# Patient Record
Sex: MALE | Race: WHITE | NOT HISPANIC OR LATINO | Employment: FULL TIME | URBAN - METROPOLITAN AREA
[De-identification: names, ages, dates, MRNs, and addresses within clinical notes are randomized per-mention and may not be internally consistent; named-entity substitution may affect disease eponyms.]

---

## 2017-01-12 ENCOUNTER — ALLSCRIPTS OFFICE VISIT (OUTPATIENT)
Dept: OTHER | Facility: OTHER | Age: 42
End: 2017-01-12

## 2017-01-12 DIAGNOSIS — R59.1 GENERALIZED ENLARGED LYMPH NODES: ICD-10-CM

## 2018-01-09 NOTE — RESULT NOTES
Verified Results  (1) CELIAC DISEASE AB PROFILE [id number] 07:20AM Jeri Amos   Performed at:  [address]  [id number]  : Gerald Pacheco MD, Phone:  [phone]     Test Name Result Flag Reference   tTG IGG 3 U/mL  0 - 5   Negative        0 - 5                              Weak Positive   6 - 9                              Positive           >9   tTG IGA <2 U/mL  0 - 3   Negative        0 -  3                              Weak Positive   4 - 10                              Positive           >10 Tissue Transglutaminase (tTG) has been identified as the endomysial antigen  Studies have demonstr- ated that endomysial IgA antibodies have over 99% specificity for gluten sensitive enteropathy     GLIADA 9 units  0 - 19   Negative                   0 - 19                   Weak Positive             20 - 30                   Moderate to Strong Positive   >30   GLIADG 4 units  0 - 19   Negative                   0 - 19                   Weak Positive             20 - 30                   Moderate to Strong Positive   >30   ENDOMYSIAL AB IGA Negative  Negative    mg/dL  91 - 414

## 2018-01-10 NOTE — RESULT NOTES
Message   Biopsy negative for microscopic colitis  Verified Results  (1) TISSUE EXAM [id number] 09:51AM Laurence Liner     Test Name Result Flag Reference   LAB AP CASE REPORT (Report)     Surgical Pathology Report             Case: [id number]                   Authorizing Provider: Nathan Harrell MD      Collected:      07/06/2016 [facility]        Ordering Location:   [address] [place] [address]   Received:      07/07/2016 [address] [facility]                               Pathologist:      Evy Zavala MD                              Specimen:  Colon, Random colon bx-cold   LAB AP FINAL DIAGNOSIS (Report)     A  Random colon biopsy:  - Benign colonic mucosa with nonspecific reactive changes   - No thickened basement membranes or intraepithelial lymphocytosis to   suggest microscopic colitis (i e  collagenous colitis or lymphocytic   colitis, resp  )   - No active or chronic colitis  - No glandular dysplasia and no evidence of malignancy  Interpretation performed at [address] [place] [address]  Electronically signed by Evy Zavala MD on 7/8/2016 at 4:03 PM   LAB AP SURGICAL ADDITIONAL INFORMATION (Report)     These tests were developed and their performance characteristics   determined by [facility] or [facility]  They may not be cleared or approved by the U [place]  Food and   Drug Administration  The FDA has determined that such clearance or   approval is not necessary  These tests are used for clinical purposes  They should not be regarded as investigational or for research  This   laboratory has been approved by CLIA 88, designated as a high-complexity   laboratory and is qualified to perform these tests  LAB AP GROSS DESCRIPTION (Report)     A  The specimen is received in formalin, labeled with the patient's name   and hospital number, and is designated random colon biopsy   The   specimen consists of multiple tan soft tissue fragments measuring in   aggregate 0 6 x 0 6 x 0 1 cm  Entirely submitted  One cassette  Note: The estimated total formalin fixation time based upon information   provided by the submitting clinician and the standard processing schedule   is 28 25 hours   [employer] [facility] [employer]    LAB AP CLINICAL INFORMATION      R/o microscopic colitis   R/o microscopic colitis

## 2018-01-11 NOTE — RESULT NOTES
Verified Results  (1) LIPID PANEL, FASTING [id number] 09:56AM Sukhjinder Dockery    Order Number: [id number]  TW Order Number: [id number] Order Number: [id number]     Test Name Result Flag Reference   CHOLESTEROL 152 mg/dL     HDL,DIRECT 31 mg/dL L 40-60   Specimen collection should occur prior to Metamizole administration due to the potential for falsely depressed results  LDL CHOLESTEROL CALCULATED 86 mg/dL  0-100   Triglyceride:         Normal              <150 mg/dl       Borderline High    150-199 mg/dl       High               200-499 mg/dl       Very High          >499 mg/dl  Cholesterol:         Desirable        <200 mg/dl      Borderline High  200-239 mg/dl      High             >239 mg/dl  HDL Cholesterol:        High    >59 mg/dL      Low     <41 mg/dL  LDL CALCULATED:    This screening LDL is a calculated result  It does not have the accuracy of the Direct Measured LDL in the monitoring of patients with hyperlipidemia and/or statin therapy  Direct Measure LDL (EYQ611) must be ordered separately in these patients  TRIGLYCERIDES 174 mg/dL H <=150   Specimen collection should occur prior to N-Acetylcysteine or Metamizole administration due to the potential for falsely depressed results  (1) COMPREHENSIVE METABOLIC PANEL [id number] 08:37TG Sukhjinder Dockery    Order Number: [id number]  TW Order Number: [id number] Order Number: [id number]     Test Name Result Flag Reference   GLUCOSE,RANDM 92 mg/dL     If the patient is fasting, the ADA then defines impaired fasting glucose as > 100 mg/dL and diabetes as > or equal to 123 mg/dL     SODIUM 142 mmol/L  136-145   POTASSIUM 4 5 mmol/L  3 5-5 3   CHLORIDE 104 mmol/L  100-108   CARBON DIOXIDE 28 mmol/L  21-32   ANION GAP (CALC) 10 mmol/L  4-13   BLOOD UREA NITROGEN 13 mg/dL  5-25   CREATININE 0 90 mg/dL  0 60-1 30   Standardized to IDMS reference method   CALCIUM 9 0 mg/dL  8 3-10 1   BILI, TOTAL 0 60 mg/dL  0 20-1 00   ALK PHOSPHATAS 51 U/L     ALT (SGPT) 49 U/L  12-78   AST(SGOT) 20 U/L  5-45   ALBUMIN 4 1 g/dL  3 5-5 0   TOTAL PROTEIN 7 3 g/dL  6 4-8 2   eGFR Non-African American      >60 0 ml/min/1 73sq [facility] [employer] Disease Education Program recommendations are as follows:  GFR calculation is accurate only with a steady state creatinine  Chronic Kidney disease less than 60 ml/min/1 73 sq  meters  Kidney failure less than 15 ml/min/1 73 sq  meters

## 2018-01-12 NOTE — RESULT NOTES
Verified Results  (1) OCCULT BLOOD, FECAL IMMUNOCHEMICAL TEST [id number] 07:39AM Ryann Alvarez   Performed by Fecal Immunochemical Test      Test Name Result Flag Reference   OCCULT BLD, FECAL IMMUNOLOGICAL Negative  Negative   POSITIVE CONTROL Positive     NEGATIVE CONTROL Negative

## 2018-01-13 VITALS
BODY MASS INDEX: 33.21 KG/M2 | DIASTOLIC BLOOD PRESSURE: 80 MMHG | SYSTOLIC BLOOD PRESSURE: 132 MMHG | WEIGHT: 232 LBS | HEIGHT: 70 IN

## 2018-01-13 NOTE — RESULT NOTES
Verified Results  (1) URINALYSIS w URINE C/S REFLEX (will reflex a microscopy if leukocytes, occult blood, or nitrites are not within normal limits) [id number] 05:56PM Javi Henriquez   TW Order Number: [id number]  TW Order Number: [id number]     Test Name Result Flag Reference   COLOR Yellow     CLARITY Clear     PH UA 6 0  4 5-8 0   LEUKOCYTE ESTERASE UA Negative  Negative   NITRITE UA Negative  Negative   PROTEIN UA Negative mg/dl  Negative   GLUCOSE UA Negative mg/dl  Negative   KETONES UA Negative mg/dl  Negative   UROBILINOGEN UA 0 2 E U /dl  0 2, 1 0 E U /dl   BILIRUBIN UA Negative  Negative   BLOOD UA Negative  Negative, Trace-Intact   SPECIFIC GRAVITY UA 1 010  1 003-1 030     (1) PSA (SCREEN) (Dx V76 44 Screen for Prostate Cancer) [id number] 05:56PM Joselyn Martha Order Number: [id number]  TW Order Number: [id number]     Test Name Result Flag Reference   PROSTATE SPECIFIC ANTIGEN 0 7 ng/mL  0 0-4 0   - Patient Instructions: This test is non-fasting  Please drink two glasses of water morning of bloodwork  - Patient Instructions: This test is non-fasting  Please drink two glasses of water morning of bloodwork  - Patient Instructions: This test is non-fasting  Please drink two glasses of water morning of bloodwork  - Patient Instructions: This test is non-fasting  Please drink two glasses of water morning of bloodwork

## 2018-01-17 NOTE — RESULT NOTES
Verified Results  (1) CBC/PLT/DIFF [id number] 07:20AM Xi Dickson     Test Name Result Flag Reference   WBC COUNT 5 79 Thousand/uL  4 31-10 16   RBC COUNT 5 50 Million/uL  3 88-5 62   HEMOGLOBIN 15 4 g/dL  12 0-17 0   HEMATOCRIT 45 6 %  36 5-49 3   MCV 83 fL  82-98   MCH 28 0 pg  26 8-34 3   MCHC 33 8 g/dL  31 4-37 4   RDW 14 0 %  11 6-15 1   MPV 10 8 fL  8 9-12 7   PLATELET COUNT 037 Thousands/uL  149-390   NEUTROPHILS RELATIVE PERCENT 55 %  43-75   LYMPHOCYTES RELATIVE PERCENT 28 %  14-44   MONOCYTES RELATIVE PERCENT 10 %  4-12   EOSINOPHILS RELATIVE PERCENT 6 %  0-6   BASOPHILS RELATIVE PERCENT 1 %  0-1   NEUTROPHILS ABSOLUTE COUNT 3 24 Thousands/µL  1 85-7 62   LYMPHOCYTES ABSOLUTE COUNT 1 63 Thousands/µL  0 60-4 47   MONOCYTES ABSOLUTE COUNT 0 57 Thousand/µL  0 17-1 22   EOSINOPHILS ABSOLUTE COUNT 0 32 Thousand/µL  0 00-0 61   BASOPHILS ABSOLUTE COUNT 0 03 Thousands/µL  0 00-0 10     (1) COMPREHENSIVE METABOLIC PANEL [id number] 88:66GF Xi Dickosn   National Kidney Disease Education Program recommendations are as follows:  GFR calculation is accurate only with a steady state creatinine  Chronic Kidney disease less than 60 ml/min/1 73 sq  meters  Kidney failure less than 15 ml/min/1 73 sq  meters  Test Name Result Flag Reference   GLUCOSE,RANDM 91 mg/dL     If the patient is fasting, the ADA then defines impaired fasting glucose as > 100 mg/dL and diabetes as > or equal to 123 mg/dL     SODIUM 143 mmol/L  136-145   POTASSIUM 4 3 mmol/L  3 5-5 3   CHLORIDE 104 mmol/L  100-108   CARBON DIOXIDE 26 mmol/L  21-32   ANION GAP (CALC) 13 mmol/L  4-13   BLOOD UREA NITROGEN 17 mg/dL  5-25   CREATININE 0 96 mg/dL  0 60-1 30   Standardized to IDMS reference method   CALCIUM 8 5 mg/dL  8 3-10 1   BILI, TOTAL 0 55 mg/dL  0 20-1 00   ALK PHOSPHATAS 53 U/L     ALT (SGPT) 57 U/L  12-78   AST(SGOT) 23 U/L  5-45   ALBUMIN 4 3 g/dL  3 5-5 0   TOTAL PROTEIN 7 5 g/dL  6 4-8 2   eGFR Non-      >60 0 ml/min/1 73sq m     (1) PSA (SCREEN) (Dx V76 44 Screen for Prostate Cancer) [id number] 07:20AM Merrily Balm     Test Name Result Flag Reference   PROSTATE SPECIFIC ANTIGEN 0 7 ng/mL  0 0-4 0     (1) URINALYSIS w URINE C/S REFLEX (will reflex a microscopy if leukocytes, occult blood, or nitrites are not within normal limits) [id number] 07:20AM Merrily [place]     Test Name Result Flag Reference   COLOR Yellow     CLARITY Clear     PH UA 5 5  4 5-8 0   LEUKOCYTE ESTERASE UA Negative  Negative   NITRITE UA Negative  Negative   PROTEIN UA Negative mg/dl  Negative   GLUCOSE UA Negative mg/dl  Negative   KETONES UA Negative mg/dl  Negative   UROBILINOGEN UA 0 2 E U /dl  0 2, 1 0 E U /dl   BILIRUBIN UA Negative  Negative   BLOOD UA Negative  Negative, Trace-Intact   SPECIFIC GRAVITY UA <=1 005  1 003-1 030

## 2018-01-18 NOTE — RESULT NOTES
Verified Results  Conner Huntley [id number] 12:40PM Christiano Haywood Order Number: [id number]     Test Name Result Flag Reference   US KIDNEY AND BLADDER (Report)     RENAL ULTRASOUND     INDICATION: Urinary frequency  Incomplete voiding  COMPARISON: None  TECHNIQUE:  Ultrasound of the retroperitoneum was performed with a curvilinear transducer utilizing volumetric sweeps and still imaging techniques  FINDINGS:     KIDNEYS:   Symmetric and normal size  Right kidney: 14 3 x 7 2 cm  Normal echogenicity and contour  No suspicious masses detected  No hydronephrosis  No shadowing calculi  No perinephric fluid collections  Left kidney: 12 9 x 7 2 cm  Normal echogenicity and contour  No suspicious masses detected  No hydronephrosis  No shadowing calculi  No perinephric fluid collections  URETERS:   Nonvisualized  BLADDER:    Normally distended  No focal thickening or mass lesions  Bilateral ureteral jets detected  Post void urinary bladder residual volume equals 10 mL  IMPRESSION:     Normal upper tracts  No hydronephrosis  Minimal post void urinary bladder residual volume          Workstation performed: [id number]     Signed by:   Antonia Pascual MD   3/28/16

## 2020-09-20 ENCOUNTER — NURSE TRIAGE (OUTPATIENT)
Dept: OTHER | Facility: OTHER | Age: 45
End: 2020-09-20

## 2020-09-20 DIAGNOSIS — Z11.59 ENCOUNTER FOR SCREENING FOR OTHER VIRAL DISEASES: Primary | ICD-10-CM

## 2020-09-20 DIAGNOSIS — Z11.59 ENCOUNTER FOR SCREENING FOR OTHER VIRAL DISEASES: ICD-10-CM

## 2020-09-20 PROCEDURE — U0003 INFECTIOUS AGENT DETECTION BY NUCLEIC ACID (DNA OR RNA); SEVERE ACUTE RESPIRATORY SYNDROME CORONAVIRUS 2 (SARS-COV-2) (CORONAVIRUS DISEASE [COVID-19]), AMPLIFIED PROBE TECHNIQUE, MAKING USE OF HIGH THROUGHPUT TECHNOLOGIES AS DESCRIBED BY CMS-2020-01-R: HCPCS | Performed by: FAMILY MEDICINE

## 2020-09-20 NOTE — TELEPHONE ENCOUNTER
Patient does not work for [employer] any longer even though I had to break the glass to see his chart  He will proceed to [facility] [place] to get his test done

## 2020-09-20 NOTE — TELEPHONE ENCOUNTER
Reason for Disposition   [1] COVID-19 EXPOSURE (Close Contact) within last 14 days AND [2] NO cough, fever, or breathing difficulty    Answer Assessment - Initial Assessment Questions  1  CLOSE CONTACT: "Who is the person with the confirmed or suspected COVID-19 infection that you were exposed to?"      A patient at work  2  PLACE of CONTACT: "Where were you when you were exposed to COVID-19?" (e g , home, school, medical waiting room; which city?)      work  3  TYPE of CONTACT: "How much contact was there?" (e g , sitting next to, live in same house, work in same office, same building)      Giving PT to this patient  4  DURATION of CONTACT: "How long were you in contact with the COVID-19 patient?" (e g , a few seconds, passed by person, a few minutes, live with the patient)      minutes  5  DATE of CONTACT: "When did you have contact with a COVID-19 patient?" (e g , how many days ago)      6 days ago  6  TRAVEL: "Have you traveled out of the country recently?" If so, "When and where?"      * Also ask about out-of-state travel, since the [facility] [place] has identified some high risk cities for community spread in the [address]  * Note: Travel becomes less relevant if there is widespread community transmission where the patient lives  None  7  COMMUNITY SPREAD: "Are there lots of cases of COVID-19 (community spread) where you live?" (See public health department website, if unsure)    * MAJOR community spread: high number of cases; numbers of cases are increasing; many people hospitalized  * MINOR community spread: low number of cases; not increasing; few or no people hospitalized      Minor  8  SYMPTOMS: "Do you have any symptoms?" (e g , fever, cough, breathing difficulty)      No symptoms  9  PREGNANCY OR POSTPARTUM: "Is there any chance you are pregnant?" "When was your last menstrual period?" "Did you deliver in the last 2 weeks?"      NA  10  HIGH RISK: "Do you have any heart or lung problems?  Do you have a weak immune system?" (e g , CHF, COPD, asthma, HIV positive, chemotherapy, renal failure, diabetes mellitus, sickle cell anemia)        None    Protocols used: CORONAVIRUS (COVID-19) - EXPOSURE-ADULT-AH

## 2020-09-20 NOTE — TELEPHONE ENCOUNTER
Regarding: Covid test report - asymptomatic - exposure  ----- Message from Calvin Baker sent at 9/20/2020 11:02 AM EDT -----  'I think I had exposure, a patient  tested positive, Physical Therapist

## 2020-09-21 LAB — SARS-COV-2 RNA SPEC QL NAA+PROBE: NOT DETECTED

## 2021-01-05 ENCOUNTER — IMMUNIZATIONS (OUTPATIENT)
Dept: FAMILY MEDICINE CLINIC | Facility: HOSPITAL | Age: 46
End: 2021-01-05

## 2021-01-05 DIAGNOSIS — Z23 ENCOUNTER FOR IMMUNIZATION: ICD-10-CM

## 2021-01-05 PROCEDURE — 0011A SARS-COV-2 / COVID-19 MRNA VACCINE (MODERNA) 100 MCG: CPT | Performed by: PHARMACIST

## 2021-01-05 PROCEDURE — 91301 SARS-COV-2 / COVID-19 MRNA VACCINE (MODERNA) 100 MCG: CPT | Performed by: PHARMACIST

## 2021-02-05 ENCOUNTER — IMMUNIZATIONS (OUTPATIENT)
Dept: FAMILY MEDICINE CLINIC | Facility: HOSPITAL | Age: 46
End: 2021-02-05

## 2021-02-05 DIAGNOSIS — Z23 ENCOUNTER FOR IMMUNIZATION: Primary | ICD-10-CM

## 2021-02-05 PROCEDURE — 91301 SARS-COV-2 / COVID-19 MRNA VACCINE (MODERNA) 100 MCG: CPT

## 2021-02-05 PROCEDURE — 0012A SARS-COV-2 / COVID-19 MRNA VACCINE (MODERNA) 100 MCG: CPT

## 2024-04-01 ENCOUNTER — OFFICE VISIT (OUTPATIENT)
Dept: URGENT CARE | Facility: MEDICAL CENTER | Age: 49
End: 2024-04-01
Payer: COMMERCIAL

## 2024-04-01 VITALS
SYSTOLIC BLOOD PRESSURE: 140 MMHG | RESPIRATION RATE: 18 BRPM | OXYGEN SATURATION: 97 % | WEIGHT: 250 LBS | TEMPERATURE: 97.4 F | DIASTOLIC BLOOD PRESSURE: 60 MMHG | HEIGHT: 72 IN | HEART RATE: 71 BPM | BODY MASS INDEX: 33.86 KG/M2

## 2024-04-01 DIAGNOSIS — J02.9 VIRAL PHARYNGITIS: Primary | ICD-10-CM

## 2024-04-01 LAB — S PYO AG THROAT QL: NEGATIVE

## 2024-04-01 PROCEDURE — 87070 CULTURE OTHR SPECIMN AEROBIC: CPT | Performed by: PHYSICIAN ASSISTANT

## 2024-04-01 PROCEDURE — 87880 STREP A ASSAY W/OPTIC: CPT | Performed by: PHYSICIAN ASSISTANT

## 2024-04-01 PROCEDURE — 99213 OFFICE O/P EST LOW 20 MIN: CPT | Performed by: PHYSICIAN ASSISTANT

## 2024-04-01 RX ORDER — LISINOPRIL 10 MG/1
10 TABLET ORAL DAILY
COMMUNITY

## 2024-04-01 NOTE — PROGRESS NOTES
[facility]        NAME: Jeronimo Fraser is a 48 y.o. male  : 1975    MRN: [id number]  DATE: 2024  TIME: 3:54 PM    Assessment and Plan   Viral pharyngitis [J02.9]  1. Viral pharyngitis  POCT rapid strepA    Throat culture    Throat culture            Patient Instructions     Fluids and rest  Salt water gargles and chloraseptic spray  Throat Coat Tea  Wash hands frequently  Don't share drinks  Tylenol/Ibuprofen for pain/fever  Follow up with PCP in 3-5 days.  Proceed to  ER if symptoms worsen.    If tests have been performed at [facility], our office will contact you with results if changes need to be made to the care plan discussed with you at the visit.  [facility].    Chief Complaint     Chief Complaint   Patient presents with    Sore Throat     Sore throat that started on Friday          History of Present Illness       Sore Throat   This is a new problem. Episode onset: Friday. There has been no fever. Pertinent negatives include no congestion, coughing, ear discharge, ear pain or headaches. Treatments tried: allegra.       Review of Systems   Review of Systems   Constitutional:  Negative for chills and fever.   HENT:  Positive for sore throat. Negative for congestion, ear discharge, ear pain, hearing loss, rhinorrhea, sinus pressure, sinus pain and sneezing.    Respiratory:  Negative for cough.    Neurological:  Negative for headaches.         Current Medications       Current Outpatient Medications:     fexofenadine (ALLEGRA) 180 MG tablet, Take 180 mg by mouth daily., Disp: , Rfl:     lisinopril (ZESTRIL) 10 mg tablet, Take 10 mg by mouth daily, Disp: , Rfl:     valACYclovir (VALTREX) 1,000 mg tablet, Take 2,000 mg by mouth 2 (two) times a day as needed (take at onset of cold sore)., Disp: , Rfl:     dicyclomine (BENTYL) 20 mg tablet, Take 20 mg by mouth every 6 (six) hours. (Patient not taking: Reported on 2024), Disp: , Rfl:      rosuvastatin (CRESTOR) 20 MG tablet, Take 20 mg by mouth. (Patient not taking: Reported on 4/1/2024), Disp: , Rfl:     Current Allergies     Allergies as of 04/01/2024 - Reviewed 04/01/2024   Allergen Reaction Noted    Pistachio nut extract skin test - food allergy Anaphylaxis 12/29/2020    Atorvastatin Myalgia 02/18/2013    Rosuvastatin Other (See Comments) 05/31/2018            The following portions of the patient's history were reviewed and updated as appropriate: allergies, current medications, past family history, past medical history, past social history, past surgical history and problem list.     Past Medical History:   Diagnosis Date    Herpes     Hyperlipidemia     Prostatitis     Sleep apnea        Past Surgical History:   Procedure Laterality Date    ADENOIDECTOMY      HERNIA REPAIR      WY COLONOSCOPY FLX DX W/COLLJ SPEC WHEN PFRMD N/A 7/6/2016    Procedure: COLONOSCOPY;  Surgeon: Noel Garzon MD;  Location: [facility];  Service: Gastroenterology       History reviewed. No pertinent family history.      Medications have been verified.        Objective   /60   Pulse 71   Temp (!) 97.4 °F (36.3 °C)   Resp 18   Ht 6' (1.829 m)   Wt 113 kg (250 lb)   SpO2 97%   BMI 33.91 kg/m²   No LMP for male patient.       Physical Exam     Physical Exam  Vitals reviewed.   Constitutional:       General: He is not in acute distress.     Appearance: He is well-developed. He is not diaphoretic.   HENT:      Head: Normocephalic and atraumatic.      Right Ear: Tympanic membrane and external ear normal.      Left Ear: Tympanic membrane and external ear normal.      Nose: Nose normal.      Mouth/Throat:      Pharynx: Uvula midline. Posterior oropharyngeal erythema present. No oropharyngeal exudate.      Tonsils: No tonsillar exudate.   Eyes:      General:         Right eye: No discharge.         Left eye: No discharge.   Cardiovascular:      Rate and Rhythm: Normal rate and regular rhythm.      Heart sounds:  Normal heart sounds. No murmur heard.     No friction rub. No gallop.   Pulmonary:      Effort: Pulmonary effort is normal. No respiratory distress.      Breath sounds: Normal breath sounds. No wheezing, rhonchi or rales.   Lymphadenopathy:      Cervical: No cervical adenopathy.   Skin:     General: Skin is warm.      Findings: No erythema.   Neurological:      Mental Status: He is alert.   Psychiatric:         Behavior: Behavior normal.         Thought Content: Thought content normal.         Judgment: Judgment normal.

## 2024-04-01 NOTE — PATIENT INSTRUCTIONS
Fluids and rest  Salt water gargles and chloraseptic spray  Throat Coat Tea  Wash hands frequently  Don't share drinks  Tylenol/Ibuprofen for pain/fever  Follow up with PCP in 3-5 days.  Proceed to  ER if symptoms worsen.    If tests have been performed at [facility], our office will contact you with results if changes need to be made to the care plan discussed with you at the visit.  You can review your full results on [facility] MyChart.

## 2024-04-03 LAB — BACTERIA THROAT CULT: NORMAL

## 2024-04-29 ENCOUNTER — EVALUATION (OUTPATIENT)
Dept: PHYSICAL THERAPY | Facility: CLINIC | Age: 49
End: 2024-04-29

## 2024-04-29 DIAGNOSIS — M79.661 RIGHT CALF PAIN: Primary | ICD-10-CM

## 2024-04-29 PROCEDURE — 97162 PT EVAL MOD COMPLEX 30 MIN: CPT | Performed by: PHYSICAL THERAPIST

## 2024-04-29 NOTE — LETTER
2024      No Recipients    Patient: Jeronimo Fraser   YOB: 1975   Date of Visit: 2024     Encounter Diagnosis     ICD-10-CM    1. Right calf pain  M79.661           Dear Dr. Ferris Recipients:    Thank you for your recent referral of Jeronimo Fraser. Please review the attached evaluation summary from Jeronimo's recent visit.     Please verify that you agree with the plan of care by signing the attached order.     If you have any questions or concerns, please do not hesitate to call.     I sincerely appreciate the opportunity to share in the care of one of your patients and hope to have another opportunity to work with you in the near future.       Sincerely,    Sravan Hannah, PT      Referring Provider:      I certify that I have read the below Plan of Care and certify the need for these services furnished under this plan of treatment while under my care.                      No Recipients          PT Evaluation     Today's date: 2024  Patient name: Jeronimo Fraser  : 1975  MRN: [id number]  Referring provider: No ref. provider found  Dx:   Encounter Diagnosis     ICD-10-CM    1. Right calf pain  M79.661                      Assessment  Assessment details: Jeronimo Fraser is a 48 y.o. year old male presenting to PT with pain, decreased range of motion, decreased strength, and decreased tolerance to activity. Signs and symptoms are consistent with referring diagnosis of R medial and proximal gastroc tear.  The existing impairments result in difficulty with all standing and walking tasks.  Currently immobilized in cam walker with return to foot and ankle in May. Jeronimo would benefit from skilled PT services to address these issues and to maximize function.  Home exercise provided and all questions answered. Thank you for the referral.      Goals  STG (2-4 weeks)  Independent with HEP  Pain <5/10  Independent with all stability exercises without form deficits  ROM WFL all  planes in BUE and BLE    LTG  (discharge)  Return to work and recreation without acute pain generated by activity  BLE strength within 20% contralateral LE  Independent with dynamic warm up and soft tissue self care          Plan  Planned therapy interventions: manual therapy, neuromuscular re-education, patient education, strengthening, stretching, therapeutic activities and therapeutic exercise  Frequency: 2x week  Duration in weeks: 8        Subjective Evaluation    History of Present Illness  Mechanism of injury: Right calf tear sustained 4/24 while chasing his dog.  Initially Jeronimo was concerned that he had torn his Achilles, but this was ruled out by Ortho.  Today, further Achilles tear ruled out with negative Mcclendon test.     He is immobilized in a cam boot and will remain out of work for a minimum of four weeks. PMH includes sjogren's syndrome along with history of biceps tears.   Patient Goals  Patient goals for therapy: decreased pain, decreased edema, increased motion, improved balance, return to sport/leisure activities, return to work, independence with ADLs/IADLs and increased strength    Pain  Location: R calf  Quality: cramping  Aggravating factors: walking and running  Progression: improved          Objective     Palpation     Right   Tenderness of the medial gastrocnemius.   Trigger point to medial gastrocnemius.     Tenderness     Right Ankle/Foot   Tenderness in the Achilles insertion.     Active Range of Motion     Right Ankle/Foot   Dorsiflexion (kf): 10 degrees     Strength/Myotome Testing     Right Ankle/Foot   Dorsiflexion: 4  Plantar flexion: 4-    General Comments:      Ankle/Foot Comments   Calf:   R:  42.0 cm  L:  40.3 cm             Precautions: gastroc tear       Manuals 4/29            FMT gastroc/soleus 30'                                                   Neuro Re-Ed             Smart foot nv            Seated MB Heel lifts nv            TB ankle x4 nv                                                                 Ther Ex                                                                                                                     Ther Activity                                       Gait Training                                       Modalities             H wave 15'

## 2024-04-29 NOTE — LETTER
2024    Sean Marcelino, LUKE  [address]     Patient: Jeronimo Fraser   YOB: 1975   Date of Visit: 2024     Encounter Diagnosis     ICD-10-CM    1. Right calf pain  M79.661           Dear Dr. Marcelino:    Thank you for your recent referral of Jeronimo Fraser. Please review the attached evaluation summary from Jeronimo's recent visit.     Please verify that you agree with the plan of care by signing the attached order.     If you have any questions or concerns, please do not hesitate to call.     I sincerely appreciate the opportunity to share in the care of one of your patients and hope to have another opportunity to work with you in the near future.       Sincerely,    Sravan Hannah, PT      Referring Provider:      I certify that I have read the below Plan of Care and certify the need for these services furnished under this plan of treatment while under my care.                    Sean Marcelino, LUKE  [address]   Via Fax: [phone]          PT Evaluation     Today's date: 2024  Patient name: Jeronimo Fraser  : 1975  MRN: [id number]  Referring provider: No ref. provider found  Dx:   Encounter Diagnosis     ICD-10-CM    1. Right calf pain  M79.661                      Assessment  Assessment details: Jeronimo Fraser is a 48 y.o. year old male presenting to PT with pain, decreased range of motion, decreased strength, and decreased tolerance to activity. Signs and symptoms are consistent with referring diagnosis of R medial and proximal gastroc tear.  The existing impairments result in difficulty with all standing and walking tasks.  Currently immobilized in cam walker with return to foot and ankle in May. Jeronimo would benefit from skilled PT services to address these issues and to maximize function.  Home exercise provided and all questions answered. Thank you for the referral.      Goals  STG  (2-4 weeks)  Independent with HEP  Pain <5/10  Independent with all stability exercises without form deficits  ROM WFL all planes in BUE and BLE    LTG  (discharge)  Return to work and recreation without acute pain generated by activity  BLE strength within 20% contralateral LE  Independent with dynamic warm up and soft tissue self care          Plan  Planned therapy interventions: manual therapy, neuromuscular re-education, patient education, strengthening, stretching, therapeutic activities and therapeutic exercise  Frequency: 2x week  Duration in weeks: 8        Subjective Evaluation    History of Present Illness  Mechanism of injury: Right calf tear sustained 4/24 while chasing his dog.  Initially Jeronimo was concerned that he had torn his Achilles, but this was ruled out by Ortho.  Today, further Achilles tear ruled out with negative Mcclendon test.     He is immobilized in a cam boot and will remain out of work for a minimum of four weeks. PMH includes sjogren's syndrome along with history of biceps tears.   Patient Goals  Patient goals for therapy: decreased pain, decreased edema, increased motion, improved balance, return to sport/leisure activities, return to work, independence with ADLs/IADLs and increased strength    Pain  Location: R calf  Quality: cramping  Aggravating factors: walking and running  Progression: improved          Objective     Palpation     Right   Tenderness of the medial gastrocnemius.   Trigger point to medial gastrocnemius.     Tenderness     Right Ankle/Foot   Tenderness in the Achilles insertion.     Active Range of Motion     Right Ankle/Foot   Dorsiflexion (kf): 10 degrees     Strength/Myotome Testing     Right Ankle/Foot   Dorsiflexion: 4  Plantar flexion: 4-    General Comments:      Ankle/Foot Comments   Calf:   R:  42.0 cm  L:  40.3 cm             Precautions: gastroc tear       Manuals 4/29            FMT gastroc/soleus 30'                                                    Neuro Re-Ed             Smart foot nv            Seated MB Heel lifts nv            TB ankle x4 nv                                                                Ther Ex                                                                                                                     Ther Activity                                       Gait Training                                       Modalities             H wave 15'

## 2024-04-29 NOTE — PROGRESS NOTES
PT Evaluation     Today's date: 2024  Patient name: Jeronimo Fraser  : 1975  MRN: [id number]  Referring provider: No ref. provider found  Dx:   Encounter Diagnosis     ICD-10-CM    1. Right calf pain  M79.661                      Assessment  Assessment details: Jeronimo Fraser is a 48 y.o. year old male presenting to PT with pain, decreased range of motion, decreased strength, and decreased tolerance to activity. Signs and symptoms are consistent with referring diagnosis of R medial and proximal gastroc tear.  The existing impairments result in difficulty with all standing and walking tasks.  Currently immobilized in cam walker with return to foot and ankle in May. Jeronimo would benefit from skilled PT services to address these issues and to maximize function.  Home exercise provided and all questions answered. Thank you for the referral.      Goals  STG (2-4 weeks)  Independent with HEP  Pain <5/10  Independent with all stability exercises without form deficits  ROM WFL all planes in BUE and BLE    LTG  (discharge)  Return to work and recreation without acute pain generated by activity  BLE strength within 20% contralateral LE  Independent with dynamic warm up and soft tissue self care          Plan  Planned therapy interventions: manual therapy, neuromuscular re-education, patient education, strengthening, stretching, therapeutic activities and therapeutic exercise  Frequency: 2x week  Duration in weeks: 8        Subjective Evaluation    History of Present Illness  Mechanism of injury: Right calf tear sustained  while chasing his dog.  Initially Jeronimo was concerned that he had torn his Achilles, but this was ruled out by Ortho.  Today, further Achilles tear ruled out with negative Mcclendon test.     He is immobilized in a cam boot and will remain out of work for a minimum of four weeks. PMH includes sjogren's syndrome along with history of biceps tears.   Patient Goals  Patient goals for  therapy: decreased pain, decreased edema, increased motion, improved balance, return to sport/leisure activities, return to work, independence with ADLs/IADLs and increased strength    Pain  Location: R calf  Quality: cramping  Aggravating factors: walking and running  Progression: improved          Objective     Palpation     Right   Tenderness of the medial gastrocnemius.   Trigger point to medial gastrocnemius.     Tenderness     Right Ankle/Foot   Tenderness in the Achilles insertion.     Active Range of Motion     Right Ankle/Foot   Dorsiflexion (kf): 10 degrees     Strength/Myotome Testing     Right Ankle/Foot   Dorsiflexion: 4  Plantar flexion: 4-    General Comments:      Ankle/Foot Comments   Calf:   R:  42.0 cm  L:  40.3 cm             Precautions: gastroc tear       Manuals 4/29            FMT gastroc/soleus 30'                                                   Neuro Re-Ed             Smart foot nv            Seated MB Heel lifts nv            TB ankle x4 nv                                                                Ther Ex                                                                                                                     Ther Activity                                       Gait Training                                       Modalities             H wave 15'

## 2024-05-07 ENCOUNTER — OFFICE VISIT (OUTPATIENT)
Dept: PHYSICAL THERAPY | Facility: CLINIC | Age: 49
End: 2024-05-07

## 2024-05-07 DIAGNOSIS — M79.661 RIGHT CALF PAIN: Primary | ICD-10-CM

## 2024-05-07 PROCEDURE — 97112 NEUROMUSCULAR REEDUCATION: CPT | Performed by: PHYSICAL THERAPIST

## 2024-05-08 NOTE — PROGRESS NOTES
Daily Note     Today's date: 2024  Patient name: Jeronimo Fraser  : 1975  MRN: [id number]  Referring provider: Sean Marcelino*  Dx:   Encounter Diagnosis     ICD-10-CM    1. Right calf pain  M79.661                      Subjective: Swelling remains aggravating with prolonged time on his feet.  He has been using air bladder less due to disomfort.       Objective: See treatment diary below      Assessment: Tolerated treatment well and continued to focus on MT to promote circulation and decrease guarding/tension.  HEP continues to include AROM and gentle stretching . Patient would benefit from continued PT      Plan: Continue per plan of care.      Precautions: gastroc tear       Manuals            FMT gastroc/soleus 30' 30'                                                  Neuro Re-Ed             Smart foot nv nv           Seated MB Heel lifts nv nv           TB ankle x4 nv nv                                                               Ther Ex                                                                                                                     Ther Activity                                       Gait Training                                       Modalities             H wave 15'

## 2024-05-10 ENCOUNTER — OFFICE VISIT (OUTPATIENT)
Dept: PHYSICAL THERAPY | Facility: CLINIC | Age: 49
End: 2024-05-10

## 2024-05-10 DIAGNOSIS — M79.661 RIGHT CALF PAIN: Primary | ICD-10-CM

## 2024-05-10 PROCEDURE — 97112 NEUROMUSCULAR REEDUCATION: CPT | Performed by: PHYSICAL THERAPIST

## 2024-05-10 NOTE — PROGRESS NOTES
Daily Note     Today's date: 5/10/2024  Patient name: Jeronimo Fraser  : 1975  MRN: [id number]  Referring provider: Sean Marcelino*  Dx:   Encounter Diagnosis     ICD-10-CM    1. Right calf pain  M79.661                      Subjective: Gradually continues to see improvement.  He has the most discomfort in his heel from boot wear      Objective: See treatment diary below      Assessment: Tolerated treatment well and muscle tightness and swelling both improved since last visit . Patient would benefit from continued PT      Plan: Continue per plan of care.      Precautions: gastroc tear       Manuals 4/29 5/8 5/10          FMT gastroc/soleus 30' 30' JL                                                 Neuro Re-Ed             Smart foot nv nv           Seated MB Heel lifts nv nv           TB ankle x4 nv nv                                                               Ther Ex                                                                                                                     Ther Activity                                       Gait Training                                       Modalities             H wave 15'

## 2024-05-13 ENCOUNTER — OFFICE VISIT (OUTPATIENT)
Dept: PHYSICAL THERAPY | Facility: CLINIC | Age: 49
End: 2024-05-13

## 2024-05-13 DIAGNOSIS — M79.661 RIGHT CALF PAIN: Primary | ICD-10-CM

## 2024-05-13 PROCEDURE — 97112 NEUROMUSCULAR REEDUCATION: CPT | Performed by: PHYSICAL THERAPIST

## 2024-05-13 NOTE — PROGRESS NOTES
Daily Note     Today's date: 2024  Patient name: Jeronimo Fraser  : 1975  MRN: [id number]  Referring provider: Sean Marcelino*  Dx:   Encounter Diagnosis     ICD-10-CM    1. Right calf pain  M79.661                      Subjective: Jeronimo continues to notice improvement in strength and activity tolerance with decreasing tightness.  Noticed improvement following IASTM last visit      Objective: See treatment diary below      Assessment: Tolerated treatment well. Patient would benefit from continued PT      Plan: Continue per plan of care.      Precautions: gastroc tear       Manuals 4/29 5/8 5/10          FMT gastroc/soleus 30' 30' JL          IASTM   JL                                    Neuro Re-Ed             Smart foot nv nv           Seated MB Heel lifts nv nv           TB ankle x4 nv nv BTB 20x          VG SL HR   40% 20x                                                 Ther Ex                                                                                                                     Ther Activity                                       Gait Training                                       Modalities             H wave 15'

## 2024-05-17 ENCOUNTER — OFFICE VISIT (OUTPATIENT)
Dept: PHYSICAL THERAPY | Facility: CLINIC | Age: 49
End: 2024-05-17

## 2024-05-17 DIAGNOSIS — M79.661 RIGHT CALF PAIN: Primary | ICD-10-CM

## 2024-05-17 PROCEDURE — 97112 NEUROMUSCULAR REEDUCATION: CPT | Performed by: PHYSICAL THERAPIST

## 2024-05-18 NOTE — PROGRESS NOTES
Daily Note     Today's date: 2024  Patient name: Jeronimo Fraser  : 1975  MRN: [id number]  Referring provider: Sean Marcelino*  Dx:   Encounter Diagnosis     ICD-10-CM    1. Right calf pain  M79.661                      Subjective: Swelling seems to be improving.  He will follow up with MD next week and discuss weaning out of boot      Objective: See treatment diary below      Assessment: Tolerated treatment well and tightness noted along lateral Achilles and proximal/medial gastroc - improved with MT.  Progressed WB activity today with good tolerance . Patient demonstrated fatigue post treatment, exhibited good technique with therapeutic exercises, and would benefit from continued PT      Plan: Continue per plan of care.      Precautions: gastroc tear       Manuals 4/29 5/8 5/10 5/17         FMT gastroc/soleus 30' 30' GARY VEGA         IASTM   GARY JL                                   Neuro Re-Ed             Smart foot nv nv           Seated MB Heel lifts nv nv           TB ankle x4 nv nv BTB 20x          VG SL HR   40% 20x DL 50% 30x         [place] backwards walk    15# 15x         SLS Airex tapsx3    10x ea                      Ther Ex                                                                                                                     Ther Activity                                       Gait Training                                       Modalities             H wave 15'

## 2024-05-21 ENCOUNTER — OFFICE VISIT (OUTPATIENT)
Dept: PHYSICAL THERAPY | Facility: CLINIC | Age: 49
End: 2024-05-21

## 2024-05-21 DIAGNOSIS — M79.661 RIGHT CALF PAIN: Primary | ICD-10-CM

## 2024-05-21 PROCEDURE — 97112 NEUROMUSCULAR REEDUCATION: CPT | Performed by: PHYSICAL THERAPIST

## 2024-05-23 NOTE — PROGRESS NOTES
Daily Note     Today's date: 2024  Patient name: Jeronimo Fraser  : 1975  MRN: [id number]  Referring provider: Sean Marcelino*  Dx:   Encounter Diagnosis     ICD-10-CM    1. Right calf pain  M79.661                      Subjective: Continues to see improvement in activity tolerance, but limited by immobilizer.  Proximal/medial gastroc remains tender, but muscle tone improving      Objective: See treatment diary below      Assessment: Tolerated treatment well and continue to progress activity outside of the boot during PT.  Tolerating eccentric HR well . Patient demonstrated fatigue post treatment, exhibited good technique with therapeutic exercises, and would benefit from continued PT      Plan: Continue per plan of care.  Progress treatment as tolerated.       Precautions: gastroc tear       Manuals 4/29 5/8 5/10 5/17 5/23        FMT gastroc/soleus 30' 30' GARY VEGA        IASTM   GARY VEGA                                  Neuro Re-Ed             Smart foot nv nv           Seated MB Heel lifts nv nv           TB ankle x4 nv nv BTB 20x          VG SL HR   40% 20x DL 50% 30x GARY [place] backwards walk    15# 15x 20# 15x        SLS Airex tapsx3    10x ea         BOSU squats     2x10        Ther Ex             HR eccentrics     10:x10        SL RDL     nv                                                                                      Ther Activity                                       Gait Training                                       Modalities             H wave 15'

## 2024-05-24 ENCOUNTER — OFFICE VISIT (OUTPATIENT)
Dept: PHYSICAL THERAPY | Facility: CLINIC | Age: 49
End: 2024-05-24

## 2024-05-24 DIAGNOSIS — M79.661 RIGHT CALF PAIN: Primary | ICD-10-CM

## 2024-05-24 PROCEDURE — 97112 NEUROMUSCULAR REEDUCATION: CPT | Performed by: PHYSICAL THERAPIST

## 2024-05-24 NOTE — PROGRESS NOTES
Daily Note     Today's date: 2024  Patient name: Jeronimo Fraser  : 1975  MRN: [id number]  Referring provider: Sean Marcelino*  Dx:   Encounter Diagnosis     ICD-10-CM    1. Right calf pain  M79.661                      Subjective: Jeronimo was released from boot by MD today, anticipates RTW in about 1 week      Objective: See treatment diary below      Assessment: Tolerated treatment well. Patient demonstrated fatigue post treatment, exhibited good technique with therapeutic exercises, and would benefit from continued PT      Plan: Continue per plan of care.  Progress note during next visit.  Potential discharge next visit.     Precautions: gastroc tear       Manuals 4/29 5/8 5/10 5/17 5/24        FMT gastroc/soleus 30' 30' JL JL JL        IASTM   JL JL JL                                  Neuro Re-Ed             Smart foot nv nv           Seated MB Heel lifts nv nv           TB ankle x4 nv nv BTB 20x          VG SL HR   40% 20x DL 50% 30x JL        [place] backwards walk    15# 15x 30# 15x        SLS Airex tapsx3    10x ea         BOSU squats     2x10        Ther Ex             HR eccentrics     10:x10        SL RDL     nv                                                                                      Ther Activity                                       Gait Training                                       Modalities             H wave 15'

## 2024-05-28 ENCOUNTER — OFFICE VISIT (OUTPATIENT)
Dept: PHYSICAL THERAPY | Facility: CLINIC | Age: 49
End: 2024-05-28

## 2024-05-28 DIAGNOSIS — M79.661 RIGHT CALF PAIN: Primary | ICD-10-CM

## 2024-05-28 PROCEDURE — 97112 NEUROMUSCULAR REEDUCATION: CPT | Performed by: PHYSICAL THERAPIST

## 2024-05-31 ENCOUNTER — OFFICE VISIT (OUTPATIENT)
Dept: PHYSICAL THERAPY | Facility: CLINIC | Age: 49
End: 2024-05-31

## 2024-05-31 DIAGNOSIS — M79.661 RIGHT CALF PAIN: Primary | ICD-10-CM

## 2024-05-31 PROCEDURE — 97112 NEUROMUSCULAR REEDUCATION: CPT | Performed by: PHYSICAL THERAPIST

## 2024-05-31 NOTE — PROGRESS NOTES
Daily Note     Today's date: 2024  Patient name: Jeronimo Fraser  : 1975  MRN: [id number]  Referring provider: Sean Marcelino*  Dx:   Encounter Diagnosis     ICD-10-CM    1. Right calf pain  M79.661                      Subjective: Overall seeing improvement.  Still has soreness in medial calf as he increases activity. RWT Monday.       Objective: See treatment diary below      Assessment: Tolerated treatment well. Patient exhibited good technique with therapeutic exercises      Plan:  DC to HEP     Precautions: gastroc tear       Manuals 4/29 5/8 5/10 5/17 5/31        FMT gastroc/soleus 30' 30' JL JL JL        IASTM   JL JL JL                                  Neuro Re-Ed             Smart foot nv nv           Seated MB Heel lifts nv nv           TB ankle x4 nv nv BTB 20x          VG SL HR   40% 20x DL 50% 30x JL        [place] backwards walk    15# 15x 30# 15x        SLS Airex tapsx3    10x ea         BOSU squats     2x10        Ther Ex             HR eccentrics     10:x10        SL RDL     nv                                                                                      Ther Activity                                       Gait Training                                       Modalities             H wave 15'

## 2024-05-31 NOTE — PROGRESS NOTES
Daily Note     Today's date: 2024  Patient name: Jeronimo Fraser  : 1975  MRN: [id number]  Referring provider: Sean Marcelino*  Dx:   Encounter Diagnosis     ICD-10-CM    1. Right calf pain  M79.661                      Subjective: Sore since transitioning out of boot into a sneaker      Objective: See treatment diary below      Assessment: Tolerated treatment well. Patient demonstrated fatigue post treatment, exhibited good technique with therapeutic exercises, and would benefit from continued PT      Plan: Continue per plan of care.      Precautions: gastroc tear       Manuals 4/29 5/8 5/10 5/17 5/28        FMT gastroc/soleus 30' 30' JL JL JL        IASTM   JL JL JL                                  Neuro Re-Ed             Smart foot nv nv           Seated MB Heel lifts nv nv           TB ankle x4 nv nv BTB 20x          VG SL HR   40% 20x DL 50% 30x JL        [place] backwards walk    15# 15x 30# 15x        SLS Airex tapsx3    10x ea         BOSU squats     2x10        Ther Ex             HR eccentrics     10:x10        SL RDL     nv                                                                                      Ther Activity                                       Gait Training                                       Modalities             H wave 15'

## 2024-08-14 ENCOUNTER — APPOINTMENT (EMERGENCY)
Dept: CT IMAGING | Facility: HOSPITAL | Age: 49
End: 2024-08-14
Payer: COMMERCIAL

## 2024-08-14 ENCOUNTER — APPOINTMENT (EMERGENCY)
Dept: RADIOLOGY | Facility: HOSPITAL | Age: 49
End: 2024-08-14
Payer: COMMERCIAL

## 2024-08-14 ENCOUNTER — HOSPITAL ENCOUNTER (EMERGENCY)
Facility: HOSPITAL | Age: 49
Discharge: ANOTHER HOSPITAL | End: 2024-08-14
Attending: EMERGENCY MEDICINE | Admitting: EMERGENCY MEDICINE
Payer: COMMERCIAL

## 2024-08-14 VITALS
HEIGHT: 72 IN | WEIGHT: 260 LBS | BODY MASS INDEX: 35.21 KG/M2 | OXYGEN SATURATION: 95 % | TEMPERATURE: 97.7 F | SYSTOLIC BLOOD PRESSURE: 144 MMHG | HEART RATE: 80 BPM | DIASTOLIC BLOOD PRESSURE: 93 MMHG | RESPIRATION RATE: 25 BRPM

## 2024-08-14 DIAGNOSIS — I21.4 NSTEMI (NON-ST ELEVATED MYOCARDIAL INFARCTION) (HCC): Primary | ICD-10-CM

## 2024-08-14 LAB
2HR DELTA HS TROPONIN: 125 NG/L
4HR DELTA HS TROPONIN: 794 NG/L
ALBUMIN SERPL BCG-MCNC: 4.6 G/DL (ref 3.5–5)
ALP SERPL-CCNC: 50 U/L (ref 34–104)
ALT SERPL W P-5'-P-CCNC: 36 U/L (ref 7–52)
ANION GAP SERPL CALCULATED.3IONS-SCNC: 8 MMOL/L (ref 4–13)
APTT PPP: 25 SECONDS (ref 23–34)
AST SERPL W P-5'-P-CCNC: 22 U/L (ref 13–39)
ATRIAL RATE: 76 BPM
ATRIAL RATE: 81 BPM
BASOPHILS # BLD AUTO: 0.04 THOUSANDS/ÂΜL (ref 0–0.1)
BASOPHILS NFR BLD AUTO: 1 % (ref 0–1)
BILIRUB SERPL-MCNC: 0.51 MG/DL (ref 0.2–1)
BNP SERPL-MCNC: 5 PG/ML (ref 0–100)
BUN SERPL-MCNC: 13 MG/DL (ref 5–25)
CALCIUM SERPL-MCNC: 9.7 MG/DL (ref 8.4–10.2)
CARDIAC TROPONIN I PNL SERPL HS: 165 NG/L
CARDIAC TROPONIN I PNL SERPL HS: 40 NG/L
CARDIAC TROPONIN I PNL SERPL HS: 834 NG/L
CHLORIDE SERPL-SCNC: 102 MMOL/L (ref 96–108)
CO2 SERPL-SCNC: 28 MMOL/L (ref 21–32)
CREAT SERPL-MCNC: 1.26 MG/DL (ref 0.6–1.3)
D DIMER PPP FEU-MCNC: <0.27 UG/ML FEU
EOSINOPHIL # BLD AUTO: 0.33 THOUSAND/ÂΜL (ref 0–0.61)
EOSINOPHIL NFR BLD AUTO: 6 % (ref 0–6)
ERYTHROCYTE [DISTWIDTH] IN BLOOD BY AUTOMATED COUNT: 12.9 % (ref 11.6–15.1)
GFR SERPL CREATININE-BSD FRML MDRD: 67 ML/MIN/1.73SQ M
GLUCOSE SERPL-MCNC: 114 MG/DL (ref 65–140)
HCT VFR BLD AUTO: 45 % (ref 36.5–49.3)
HGB BLD-MCNC: 15.2 G/DL (ref 12–17)
IMM GRANULOCYTES # BLD AUTO: 0.01 THOUSAND/UL (ref 0–0.2)
IMM GRANULOCYTES NFR BLD AUTO: 0 % (ref 0–2)
INR PPP: 1 (ref 0.85–1.19)
LIPASE SERPL-CCNC: 33 U/L (ref 11–82)
LYMPHOCYTES # BLD AUTO: 1.4 THOUSANDS/ÂΜL (ref 0.6–4.47)
LYMPHOCYTES NFR BLD AUTO: 25 % (ref 14–44)
MAGNESIUM SERPL-MCNC: 2.1 MG/DL (ref 1.9–2.7)
MCH RBC QN AUTO: 29 PG (ref 26.8–34.3)
MCHC RBC AUTO-ENTMCNC: 33.8 G/DL (ref 31.4–37.4)
MCV RBC AUTO: 86 FL (ref 82–98)
MONOCYTES # BLD AUTO: 0.43 THOUSAND/ÂΜL (ref 0.17–1.22)
MONOCYTES NFR BLD AUTO: 8 % (ref 4–12)
NEUTROPHILS # BLD AUTO: 3.48 THOUSANDS/ÂΜL (ref 1.85–7.62)
NEUTS SEG NFR BLD AUTO: 60 % (ref 43–75)
NRBC BLD AUTO-RTO: 0 /100 WBCS
P AXIS: 39 DEGREES
P AXIS: 43 DEGREES
PLATELET # BLD AUTO: 239 THOUSANDS/UL (ref 149–390)
PMV BLD AUTO: 9.9 FL (ref 8.9–12.7)
POTASSIUM SERPL-SCNC: 4.1 MMOL/L (ref 3.5–5.3)
PR INTERVAL: 178 MS
PR INTERVAL: 178 MS
PROT SERPL-MCNC: 7.5 G/DL (ref 6.4–8.4)
PROTHROMBIN TIME: 13.7 SECONDS (ref 12.3–15)
QRS AXIS: -1 DEGREES
QRS AXIS: -1 DEGREES
QRSD INTERVAL: 100 MS
QRSD INTERVAL: 102 MS
QT INTERVAL: 380 MS
QT INTERVAL: 380 MS
QTC INTERVAL: 427 MS
QTC INTERVAL: 441 MS
RBC # BLD AUTO: 5.25 MILLION/UL (ref 3.88–5.62)
SODIUM SERPL-SCNC: 138 MMOL/L (ref 135–147)
T WAVE AXIS: 22 DEGREES
T WAVE AXIS: 23 DEGREES
VENTRICULAR RATE: 76 BPM
VENTRICULAR RATE: 81 BPM
WBC # BLD AUTO: 5.69 THOUSAND/UL (ref 4.31–10.16)

## 2024-08-14 PROCEDURE — 99285 EMERGENCY DEPT VISIT HI MDM: CPT | Performed by: EMERGENCY MEDICINE

## 2024-08-14 PROCEDURE — 96374 THER/PROPH/DIAG INJ IV PUSH: CPT

## 2024-08-14 PROCEDURE — 96366 THER/PROPH/DIAG IV INF ADDON: CPT

## 2024-08-14 PROCEDURE — 83880 ASSAY OF NATRIURETIC PEPTIDE: CPT | Performed by: EMERGENCY MEDICINE

## 2024-08-14 PROCEDURE — 99285 EMERGENCY DEPT VISIT HI MDM: CPT

## 2024-08-14 PROCEDURE — 93010 ELECTROCARDIOGRAM REPORT: CPT | Performed by: INTERNAL MEDICINE

## 2024-08-14 PROCEDURE — 80053 COMPREHEN METABOLIC PANEL: CPT | Performed by: EMERGENCY MEDICINE

## 2024-08-14 PROCEDURE — 71275 CT ANGIOGRAPHY CHEST: CPT

## 2024-08-14 PROCEDURE — 84484 ASSAY OF TROPONIN QUANT: CPT | Performed by: EMERGENCY MEDICINE

## 2024-08-14 PROCEDURE — 36415 COLL VENOUS BLD VENIPUNCTURE: CPT | Performed by: EMERGENCY MEDICINE

## 2024-08-14 PROCEDURE — 96365 THER/PROPH/DIAG IV INF INIT: CPT

## 2024-08-14 PROCEDURE — 96375 TX/PRO/DX INJ NEW DRUG ADDON: CPT

## 2024-08-14 PROCEDURE — 83690 ASSAY OF LIPASE: CPT | Performed by: EMERGENCY MEDICINE

## 2024-08-14 PROCEDURE — 93005 ELECTROCARDIOGRAM TRACING: CPT

## 2024-08-14 PROCEDURE — 71046 X-RAY EXAM CHEST 2 VIEWS: CPT

## 2024-08-14 PROCEDURE — 85379 FIBRIN DEGRADATION QUANT: CPT | Performed by: EMERGENCY MEDICINE

## 2024-08-14 PROCEDURE — 85730 THROMBOPLASTIN TIME PARTIAL: CPT | Performed by: EMERGENCY MEDICINE

## 2024-08-14 PROCEDURE — 85610 PROTHROMBIN TIME: CPT | Performed by: EMERGENCY MEDICINE

## 2024-08-14 PROCEDURE — 74174 CTA ABD&PLVS W/CONTRAST: CPT

## 2024-08-14 PROCEDURE — 85025 COMPLETE CBC W/AUTO DIFF WBC: CPT | Performed by: EMERGENCY MEDICINE

## 2024-08-14 PROCEDURE — 83735 ASSAY OF MAGNESIUM: CPT | Performed by: EMERGENCY MEDICINE

## 2024-08-14 RX ORDER — PILOCARPINE HYDROCHLORIDE 5 MG/1
10 TABLET, FILM COATED ORAL 4 TIMES DAILY
COMMUNITY
Start: 2024-07-11

## 2024-08-14 RX ORDER — OMEPRAZOLE 40 MG/1
40 CAPSULE, DELAYED RELEASE ORAL
COMMUNITY
Start: 2024-08-12

## 2024-08-14 RX ORDER — TESTOSTERONE 1.62 MG/G
20.25 GEL TRANSDERMAL EVERY MORNING
COMMUNITY
Start: 2024-08-13

## 2024-08-14 RX ORDER — HEPARIN SODIUM 1000 [USP'U]/ML
4000 INJECTION, SOLUTION INTRAVENOUS; SUBCUTANEOUS EVERY 6 HOURS PRN
Status: DISCONTINUED | OUTPATIENT
Start: 2024-08-14 | End: 2024-08-14 | Stop reason: HOSPADM

## 2024-08-14 RX ORDER — HEPARIN SODIUM 10000 [USP'U]/100ML
3-20 INJECTION, SOLUTION INTRAVENOUS
Status: DISCONTINUED | OUTPATIENT
Start: 2024-08-14 | End: 2024-08-14 | Stop reason: HOSPADM

## 2024-08-14 RX ORDER — HEPARIN SODIUM 1000 [USP'U]/ML
2000 INJECTION, SOLUTION INTRAVENOUS; SUBCUTANEOUS EVERY 6 HOURS PRN
Status: DISCONTINUED | OUTPATIENT
Start: 2024-08-14 | End: 2024-08-14 | Stop reason: HOSPADM

## 2024-08-14 RX ORDER — HEPARIN SODIUM 1000 [USP'U]/ML
4000 INJECTION, SOLUTION INTRAVENOUS; SUBCUTANEOUS ONCE
Status: COMPLETED | OUTPATIENT
Start: 2024-08-14 | End: 2024-08-14

## 2024-08-14 RX ORDER — FENTANYL CITRATE 50 UG/ML
25 INJECTION, SOLUTION INTRAMUSCULAR; INTRAVENOUS ONCE
Status: COMPLETED | OUTPATIENT
Start: 2024-08-14 | End: 2024-08-14

## 2024-08-14 RX ADMIN — HEPARIN SODIUM 11.1 UNITS/KG/HR: 10000 INJECTION, SOLUTION INTRAVENOUS at 17:05

## 2024-08-14 RX ADMIN — FENTANYL CITRATE 25 MCG: 50 INJECTION INTRAMUSCULAR; INTRAVENOUS at 14:43

## 2024-08-14 RX ADMIN — HEPARIN SODIUM 4000 UNITS: 1000 INJECTION INTRAVENOUS; SUBCUTANEOUS at 17:00

## 2024-08-14 RX ADMIN — IOHEXOL 100 ML: 350 INJECTION, SOLUTION INTRAVENOUS at 15:16

## 2024-08-14 NOTE — EMTALA/ACUTE CARE TRANSFER
[facility] EMERGENCY DEPARTMENT  [address] [facility] [id number]  Dept: [phone]      EMTALA TRANSFER CONSENT    NAME Jeronimo Fraser                                         1975                              MRN [id number]    I have been informed of my rights regarding examination, treatment, and transfer   by Dr. Saundra Lauren DO    Benefits: Specialized equipment and/or services available at the receiving facility (Include comment)________________________    Risks: Potential for delay in receiving treatment, Potential deterioration of medical condition, Loss of IV, Increased discomfort during transfer, Possible worsening of condition or death during transfer      Transfer Request   I acknowledge that my medical condition has been evaluated and explained to me by the emergency department physician or other qualified medical person and/or my attending physician who has recommended and offered to me further medical examination and treatment. I understand the Hospital's obligation with respect to the treatment and stabilization of my emergency medical condition. I nevertheless request to be transferred. I release the Hospital, the doctor, and any other persons caring for me from all responsibility or liability for any injury or ill effects that may result from my transfer and agree to accept all responsibility for the consequences of my choice to transfer, rather than receive stabilizing treatment at the Hospital. I understand that because the transfer is my request, my insurance may not provide reimbursement for the services.  The Hospital will assist and direct me and my family in how to make arrangements for transfer, but the hospital is not liable for any fees charged by the transport service.  In spite of this understanding, I refuse to consent to further medical examination and treatment which has been offered to me, and request transfer to Accepting Facility Name, City &  State : [facility], [facility]. I authorize the performance of emergency medical procedures and treatments upon me in both transit and upon arrival at the receiving facility.  Additionally, I authorize the release of any and all medical records to the receiving facility and request they be transported with me, if possible.    I authorize the performance of emergency medical procedures and treatments upon me in both transit and upon arrival at the receiving facility.  Additionally, I authorize the release of any and all medical records to the receiving facility and request they be transported with me, if possible.  I understand that the safest mode of transportation during a medical emergency is an ambulance and that the Hospital advocates the use of this mode of transport. Risks of traveling to the receiving facility by car, including absence of medical control, life sustaining equipment, such as oxygen, and medical personnel has been explained to me and I fully understand them.    (ORLY CORRECT BOX BELOW)  [  ]  I consent to the stated transfer and to be transported by ambulance/helicopter.  [  ]  I consent to the stated transfer, but refuse transportation by ambulance and accept full responsibility for my transportation by car.  I understand the risks of non-ambulance transfers and I exonerate the Hospital and its staff from any deterioration in my condition that results from this refusal.    X___________________________________________    DATE  24  TIME________  Signature of patient or legally responsible individual signing on patient behalf           RELATIONSHIP TO PATIENT_________________________          Provider Certification    NAME Jeronimolin Fraser                                         1975                              MRN [id number]    A medical screening exam was performed on the above named patient.  Based on the examination:    Condition Necessitating Transfer The encounter diagnosis  was NSTEMI (non-ST elevated myocardial infarction) (HCC).    Patient Condition: The patient has been stabilized such that within reasonable medical probability, no material deterioration of the patient condition or the condition of the unborn child(cecy) is likely to result from the transfer    Reason for Transfer: Level of Care needed not available at this facility    Transfer Requirements: Facility [facility]   Space available and qualified personnel available for treatment as acknowledged by [place]  Agreed to accept transfer and to provide appropriate medical treatment as acknowledged by       Dr Jazmin Carvalho  Appropriate medical records of the examination and treatment of the patient are provided at the time of transfer   STAFF INITIAL WHEN COMPLETED _______  Transfer will be performed by qualified personnel from    and appropriate transfer equipment as required, including the use of necessary and appropriate life support measures.    Provider Certification: I have examined the patient and explained the following risks and benefits of being transferred/refusing transfer to the patient/family:  General risk, such as traffic hazards, adverse weather conditions, rough terrain or turbulence, possible failure of equipment (including vehicle or aircraft), or consequences of actions of persons outside the control of the transport personnel, Unanticipated needs of medical equipment and personnel during transport, Risk of worsening condition, The possibility of a transport vehicle being unavailable      Based on these reasonable risks and benefits to the patient and/or the unborn child(cecy), and based upon the information available at the time of the patient’s examination, I certify that the medical benefits reasonably to be expected from the provision of appropriate medical treatments at another medical facility outweigh the increasing risks, if any, to the individual’s medical condition, and in the  case of labor to the unborn child, from effecting the transfer.    X____________________________________________ DATE 08/14/24        TIME_______      ORIGINAL - SEND TO MEDICAL RECORDS   COPY - SEND WITH PATIENT DURING TRANSFER

## 2024-08-14 NOTE — ED PROVIDER NOTES
History  Chief Complaint   Patient presents with    Chest Pain     Pt arrives via EMS with c/o chest pain and pain radiating down left arm while driving today. Pt given 324mg asa with some relief.      Patient is a 48-year-old male presenting to the emergency department complaining of pressure in the left chest that started approximately 2 hours prior to coming to the emergency department, while he was driving in a car, pain radiated down the left arm and into his pinky, he did take 324 of aspirin and symptoms have subsided mostly, he denies any diaphoresis, no shortness of breath, no recent illness or injury, no known history of CAD, no sick contacts, he did recently see his PCP for weight gain and concern for fluid retention with swelling in his legs, however reports that his CHF markers were normal        Prior to Admission Medications   Prescriptions Last Dose Informant Patient Reported? Taking?   dicyclomine (BENTYL) 20 mg tablet   Yes No   Sig: Take 20 mg by mouth every 6 (six) hours.   Patient not taking: Reported on 4/1/2024   fexofenadine (ALLEGRA) 180 MG tablet   Yes No   Sig: Take 180 mg by mouth daily.   lisinopril (ZESTRIL) 10 mg tablet   Yes No   Sig: Take 10 mg by mouth daily   rosuvastatin (CRESTOR) 20 MG tablet   Yes No   Sig: Take 20 mg by mouth.   Patient not taking: Reported on 4/1/2024   valACYclovir (VALTREX) 1,000 mg tablet   Yes No   Sig: Take 2,000 mg by mouth 2 (two) times a day as needed (take at onset of cold sore).      Facility-Administered Medications: None       Past Medical History:   Diagnosis Date    Herpes     Hyperlipidemia     Prostatitis     Sleep apnea        Past Surgical History:   Procedure Laterality Date    ADENOIDECTOMY      HERNIA REPAIR      AZ COLONOSCOPY FLX DX W/COLLJ SPEC WHEN PFRMD N/A 7/6/2016    Procedure: COLONOSCOPY;  Surgeon: Noel Garzon MD;  Location: [facility];  Service: Gastroenterology       History reviewed. No pertinent family history.  I have  reviewed and agree with the history as documented.    E-Cigarette/Vaping     E-Cigarette/Vaping Substances     Social History     Tobacco Use    Smoking status: Never   Substance Use Topics    Alcohol use: No    Drug use: No       Review of Systems   Constitutional: Negative.    HENT: Negative.     Eyes: Negative.    Respiratory: Negative.     Cardiovascular:  Positive for chest pain and leg swelling.   Gastrointestinal: Negative.    Endocrine: Negative.    Genitourinary: Negative.    Musculoskeletal: Negative.    Skin: Negative.    Allergic/Immunologic: Negative.    Neurological: Negative.    Hematological: Negative.    Psychiatric/Behavioral: Negative.         Physical Exam  Physical Exam  Constitutional:       Appearance: He is well-developed.   HENT:      Head: Normocephalic and atraumatic.   Eyes:      Conjunctiva/sclera: Conjunctivae normal.      Pupils: Pupils are equal, round, and reactive to light.   Cardiovascular:      Rate and Rhythm: Normal rate and regular rhythm.      Heart sounds: Normal heart sounds.   Pulmonary:      Effort: Pulmonary effort is normal.      Breath sounds: Normal breath sounds.   Abdominal:      Palpations: Abdomen is soft.   Musculoskeletal:         General: Normal range of motion.      Cervical back: Normal range of motion and neck supple.   Skin:     General: Skin is warm and dry.   Neurological:      Mental Status: He is alert and oriented to person, place, and time.         Vital Signs  ED Triage Vitals   Temperature Pulse Respirations Blood Pressure SpO2   08/14/24 1358 08/14/24 1358 08/14/24 1358 08/14/24 1358 08/14/24 1358   97.7 °F (36.5 °C) 79 18 (!) 163/115 98 %      Temp Source Heart Rate Source Patient Position - Orthostatic VS BP Location FiO2 (%)   08/14/24 1358 08/14/24 1358 08/14/24 1358 08/14/24 1358 --   Temporal Monitor Sitting Right arm       Pain Score       08/14/24 1443       7           Vitals:    08/14/24 1630 08/14/24 1700 08/14/24 1730 08/14/24 1800   BP:  152/96 (!) 164/101 168/99 150/81   Pulse: 99 78 75 78   Patient Position - Orthostatic VS:             Visual Acuity      ED Medications  Medications   heparin (porcine) 25,000 units in 0.45% NaCl 250 mL infusion (premix) (11.1 Units/kg/hr × 90 kg (Order-Specific) Intravenous New Bag 8/14/24 1705)   heparin (porcine) injection 4,000 Units (has no administration in time range)   heparin (porcine) injection 2,000 Units (has no administration in time range)   fentaNYL injection 25 mcg (25 mcg Intravenous Given 8/14/24 1443)   iohexol (OMNIPAQUE) 350 MG/ML injection (MULTI-DOSE) 100 mL (100 mL Intravenous Given 8/14/24 1516)   heparin (porcine) injection 4,000 Units (4,000 Units Intravenous Given 8/14/24 1700)       Diagnostic Studies  Results Reviewed       Procedure Component Value Units Date/Time    HS Troponin I 4hr [[id number]]  (Abnormal) Collected: 08/14/24 1823    Lab Status: Final result Specimen: Blood from Arm, Left Updated: 08/14/24 1854     hs TnI 4hr 834 ng/L      Delta 4hr hsTnI 794 ng/L     APTT six (6) hours after Heparin bolus/drip initiation or dosing change [[id number]]     Lab Status: No result Specimen: Blood     HS Troponin I 2hr [[id number]]  (Abnormal) Collected: 08/14/24 1558    Lab Status: Final result Specimen: Blood from Arm, Left Updated: 08/14/24 1626     hs TnI 2hr 165 ng/L      Delta 2hr hsTnI 125 ng/L     B-Type Natriuretic Peptide(BNP) [[id number]]  (Normal) Collected: 08/14/24 1415    Lab Status: Final result Specimen: Blood from Arm, Left Updated: 08/14/24 1450     BNP 5 pg/mL     HS Troponin 0hr (reflex protocol) [[id number]]  (Normal) Collected: 08/14/24 1415    Lab Status: Final result Specimen: Blood from Arm, Left Updated: 08/14/24 1448     hs TnI 0hr 40 ng/L     Comprehensive metabolic panel [[id number]] Collected: 08/14/24 1415    Lab Status: Final result Specimen: Blood from Arm, Left Updated: 08/14/24 1445     Sodium 138 mmol/L      Potassium 4.1 mmol/L      Chloride 102 mmol/L       CO2 28 mmol/L      ANION GAP 8 mmol/L      BUN 13 mg/dL      Creatinine 1.26 mg/dL      Glucose 114 mg/dL      Calcium 9.7 mg/dL      AST 22 U/L      ALT 36 U/L      Alkaline Phosphatase 50 U/L      Total Protein 7.5 g/dL      Albumin 4.6 g/dL      Total Bilirubin 0.51 mg/dL      eGFR 67 ml/min/1.73sq m     Narrative:      National Kidney Disease Foundation guidelines for Chronic Kidney Disease (CKD):     Stage 1 with normal or high GFR (GFR > 90 mL/min/1.73 square meters)    Stage 2 Mild CKD (GFR = 60-89 mL/min/1.73 square meters)    Stage 3A Moderate CKD (GFR = 45-59 mL/min/1.73 square meters)    Stage 3B Moderate CKD (GFR = 30-44 mL/min/1.73 square meters)    Stage 4 Severe CKD (GFR = 15-29 mL/min/1.73 square meters)    Stage 5 End Stage CKD (GFR <15 mL/min/1.73 square meters)  Note: GFR calculation is accurate only with a steady state creatinine    Magnesium [[id number]]  (Normal) Collected: 08/14/24 1415    Lab Status: Final result Specimen: Blood from Arm, Left Updated: 08/14/24 1445     Magnesium 2.1 mg/dL     Lipase [[id number]]  (Normal) Collected: 08/14/24 1415    Lab Status: Final result Specimen: Blood from Arm, Left Updated: 08/14/24 1445     Lipase 33 u/L     D-Dimer [[id number]]  (Normal) Collected: 08/14/24 1415    Lab Status: Final result Specimen: Blood from Arm, Left Updated: 08/14/24 1442     D-Dimer, Quant <0.27 ug/ml FEU     Protime-INR [[id number]]  (Normal) Collected: 08/14/24 1415    Lab Status: Final result Specimen: Blood from Arm, Left Updated: 08/14/24 1437     Protime 13.7 seconds      INR 1.00    Narrative:      INR Therapeutic Range    Indication                                             INR Range      Atrial Fibrillation                                               2.0-3.0  Hypercoagulable State                                    2.0.2.3  Left Ventricular Asist Device                            2.0-3.0  Mechanical Heart Valve                                  -    Aortic(with afib,  MI, embolism, HF, LA enlargement,    and/or coagulopathy)                                     2.0-3.0 (2.5-3.5)     Mitral                                                             2.5-3.5  Prosthetic/Bioprosthetic Heart Valve               2.0-3.0  Venous thromboembolism (VTE: VT, PE        2.0-3.0    APTT [[id number]]  (Normal) Collected: 08/14/24 1415    Lab Status: Final result Specimen: Blood from Arm, Left Updated: 08/14/24 1437     PTT 25 seconds     CBC and differential [[id number]] Collected: 08/14/24 1415    Lab Status: Final result Specimen: Blood from Arm, Left Updated: 08/14/24 1422     WBC 5.69 Thousand/uL      RBC 5.25 Million/uL      Hemoglobin 15.2 g/dL      Hematocrit 45.0 %      MCV 86 fL      MCH 29.0 pg      MCHC 33.8 g/dL      RDW 12.9 %      MPV 9.9 fL      Platelets 239 Thousands/uL      nRBC 0 /100 WBCs      Segmented % 60 %      Immature Grans % 0 %      Lymphocytes % 25 %      Monocytes % 8 %      Eosinophils Relative 6 %      Basophils Relative 1 %      Absolute Neutrophils 3.48 Thousands/µL      Absolute Immature Grans 0.01 Thousand/uL      Absolute Lymphocytes 1.40 Thousands/µL      Absolute Monocytes 0.43 Thousand/µL      Eosinophils Absolute 0.33 Thousand/µL      Basophils Absolute 0.04 Thousands/µL                    CTA dissection protocol chest abdomen pelvis w wo contrast   Final Result by Marcial Munoz MD (08/14 4059)      No acute aortic syndrome.      Fatty liver.      Small hiatal hernia.               Workstation performed: [id number]         XR chest 2 views   Final Result by Jomar Matute MD (08/14 1239)      No acute cardiopulmonary disease.            Workstation performed: [id number]                    Procedures  ECG 12 Lead Documentation Only    Date/Time: 8/14/2024 2:09 PM    Performed by: Saundra Lauren DO  Authorized by: Saundra Lauren DO    Indications / Diagnosis:  Chest pain  ECG reviewed by me, the ED Provider: yes    Patient location:  ED  Previous  ECG:     Comparison to cardiac monitor: Yes    Interpretation:     Interpretation: normal    Rate:     ECG rate:  81    ECG rate assessment: normal    Rhythm:     Rhythm: sinus rhythm    Ectopy:     Ectopy: none    QRS:     QRS intervals:  Normal  Conduction:     Conduction: normal    ST segments:     ST segments:  Normal  T waves:     T waves: inverted      Inverted:  III           ED Course  ED Course as of 08/14/24 1937   Wed Aug 14, 2024   1634 Patient took full dose aspirin prior to coming to the ED   1935 Patient with elevated troponin at 2 hours, symptoms concerning for ACS, no chest pain at present time and stable vital signs, recommended transfer to tertiary care center with cardiac catheterization capabilities, patient's wife at bedside reports that she works for the [facility] in [employer] system and would prefer transfer there, PAC referral was placed, patient was discussed with cardiologist at [facility], Dr. Bandar Dhaliwal and internal medicine physician, Dr. Jazmin Chang who agreed with transfer and accept patient   1936 HS Troponin I 4hr(!)   1936 B-Type Natriuretic Peptide(BNP)   1936 Lipase   1936 Comprehensive metabolic panel   1936 HS Troponin I 2hr(!)   1936 HS Troponin 0hr (reflex protocol)   1936 Magnesium   1936 D-Dimer   1936 APTT   1936 CBC and differential   1936 Protime-INR   1936 ECG 12 lead   1936 CTA dissection protocol chest abdomen pelvis w wo contrast   1936 XR chest 2 views               HEART Risk Score      Flowsheet Row Most Recent Value   Heart Score Risk Calculator    History 1 Filed at: 08/14/2024 1508   ECG 1 Filed at: 08/14/2024 1508   Age 1 Filed at: 08/14/2024 1508   Risk Factors 1 Filed at: 08/14/2024 1508   Troponin 2 Filed at: 08/14/2024 1508   HEART Score 6 Filed at: 08/14/2024 1508                          SBIRT 20yo+      Flowsheet Row Most Recent Value   Initial Alcohol Screen: US AUDIT-C     1. How often do you have a drink containing alcohol? 0 Filed at:  08/14/2024 1401   2. How many drinks containing alcohol do you have on a typical day you are drinking?  0 Filed at: 08/14/2024 1401   3a. Male UNDER 65: How often do you have five or more drinks on one occasion? 0 Filed at: 08/14/2024 1401   3b. FEMALE Any Age, or MALE 65+: How often do you have 4 or more drinks on one occassion? 0 Filed at: 08/14/2024 1401   Audit-C Score 0 Filed at: 08/14/2024 1401   YAO: How many times in the past year have you...    Used an illegal drug or used a prescription medication for non-medical reasons? Never Filed at: 08/14/2024 1401                      Medical Decision Making  Patient presents with chest pain concerning for ACS.  EKG shows no evidence of acute ischemic changes, however troponin is elevated concerning for NSTEMI.  The patient was given aspirin, started on heparin drip and pain was controlled with appropriate medications.  Patient shows no signs of heart failure.  Given history and story considered but low risk for aortic dissection, pneumonia or PE.  No evidence of pneumothorax or traumatic injury.  Patient will be transferred to tertiary care center for further evaluation and treatment.    Problems Addressed:  NSTEMI (non-ST elevated myocardial infarction) (HCC): acute illness or injury    Amount and/or Complexity of Data Reviewed  Labs: ordered. Decision-making details documented in ED Course.  Radiology: ordered and independent interpretation performed. Decision-making details documented in ED Course.  ECG/medicine tests: ordered and independent interpretation performed. Decision-making details documented in ED Course.    Risk  Prescription drug management.  Decision regarding hospitalization.                 Disposition  Final diagnoses:   NSTEMI (non-ST elevated myocardial infarction) (HCC)     Time reflects when diagnosis was documented in both MDM as applicable and the Disposition within this note       Time User Action Codes Description Comment    8/14/2024   5:15 PM Saundra Lauren Add [I21.4] NSTEMI (non-ST elevated myocardial infarction) (HCC)           ED Disposition       ED Disposition   Transfer to Another Facility - Out of Network    Condition   --    Date/Time   Wed Aug 14, 2024 1715    Comment   Jeronimo Fraser should be transferred out to [facility].               MD Documentation      Flowsheet Row Most Recent Value   Patient Condition The patient has been stabilized such that within reasonable medical probability, no material deterioration of the patient condition or the condition of the unborn child(cecy) is likely to result from the transfer   Reason for Transfer Level of Care needed not available at this facility   Benefits of Transfer Specialized equipment and/or services available at the receiving facility (Include comment)________________________   Risks of Transfer Potential for delay in receiving treatment, Potential deterioration of medical condition, Loss of IV, Increased discomfort during transfer, Possible worsening of condition or death during transfer   Accepting Physician Dr Jazmin Carvalho   Accepting Facility Name, [facility]    (Name & Tel number) [place]   Sending MD Saundra Lauren   Provider Certification General risk, such as traffic hazards, adverse weather conditions, rough terrain or turbulence, possible failure of equipment (including vehicle or aircraft), or consequences of actions of persons outside the control of the transport personnel, Unanticipated needs of medical equipment and personnel during transport, Risk of worsening condition, The possibility of a transport vehicle being unavailable          RN Documentation      Flowsheet Row Most Recent Value   Accepting Facility Name, [facility]    (Name & Tel number) [place]          Follow-up Information    None         Patient's Medications   Discharge Prescriptions     No medications on file       No discharge procedures on file.    PDMP Review       None            ED Provider  Electronically Signed by             Saundra Lauren DO  08/14/24 7487

## 2024-08-15 NOTE — ED NOTES
Attempted to call report at this time, number left for them to call me back      Delta Pierce, YONI  08/14/24 2004     36.8

## 2024-08-15 NOTE — ED NOTES
Pt report to EMS at this time, care transferred at this time      Delta Pierce, YONI  08/14/24 6631

## 2024-09-04 ENCOUNTER — CLINICAL SUPPORT (OUTPATIENT)
Dept: CARDIAC REHAB | Facility: HOSPITAL | Age: 49
End: 2024-09-04
Payer: COMMERCIAL

## 2024-09-04 DIAGNOSIS — I21.4 NSTEMI (NON-ST ELEVATED MYOCARDIAL INFARCTION) (HCC): ICD-10-CM

## 2024-09-04 DIAGNOSIS — Z95.5 STENTED CORONARY ARTERY: Primary | ICD-10-CM

## 2024-09-04 PROCEDURE — 93797 PHYS/QHP OP CAR RHAB WO ECG: CPT

## 2024-09-04 NOTE — PROGRESS NOTES
CARDIAC REHABILITATION   ASSESSMENT AND INDIVIDUALIZED TREATMENT PLAN  INITIAL           Today's date: 2024   # of Exercise Sessions Completed: 1  Patient name: Jeronimo Fraser      : 1975  Age: 48 y.o.       MRN: [id number]  Referring Physician: Everett Malave/Yeimi Gomes MD  Cardiologist: Everett Gan  [facility]  Provider: Rosemary  Clinician: Cristal Romeo RN        Treatment is tailored to this patient's individual needs.  The ITP was reviewed with the patient and all questions were answered to their satisfaction.  Additional ITP documentation can be found electronically including daily and monthly exercise summaries, daily session notes with ECG summaries, education notes, daily medication reconciliation, and daily physician supervision.      Comments: Jeronimo came for his cardiac rehab interview today. 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 [facility] developing chest pain at work. He was transferred to [facility] and had a cardiac cath and stent insertion. His EF is 63%. He is starting on Repatha this week for his lipids.         Dx: NSTEMI    Description of Diagnosis: NSTEMI  stent- Right posterolateral   Date of onset: 8/15/24  Other Cardiac History: HTN/Hyperlipidemia        ASSESSMENT    Medical History:   Past Medical History:   Diagnosis Date    Herpes     Hyperlipidemia     Prostatitis     Sleep apnea        Family History:  No family history on file.    Allergies:   Pistachio nut extract skin test - food allergy, Atorvastatin, and Rosuvastatin    Current Medications:   Current Outpatient Medications   Medication Sig Dispense Refill    dicyclomine (BENTYL) 20 mg tablet Take 20 mg by mouth every 6 (six) hours. (Patient not taking: Reported on 2024)      fexofenadine (ALLEGRA) 180 MG tablet Take 180 mg by mouth daily. (Patient not taking: Reported on 2024)      lisinopril (ZESTRIL) 10 mg tablet Take 10 mg by  mouth daily      omeprazole (PriLOSEC) 40 MG capsule Take 40 mg by mouth daily at bedtime      pilocarpine (SALAGEN) 5 mg tablet Take 10 mg by mouth 4 (four) times a day      rosuvastatin (CRESTOR) 20 MG tablet Take 20 mg by mouth. (Patient not taking: Reported on 4/1/2024)      testosterone (ANDROGEL) 1.62 % TD gel pump Apply 20.25 mg topically every morning      valACYclovir (VALTREX) 1,000 mg tablet Take 2,000 mg by mouth 2 (two) times a day as needed (take at onset of cold sore).       No current facility-administered medications for this visit.       Medication compliance: Yes   Comments: Pt reports to be compliant with medications    Physical Limitations: None /Torn muscle in right leg some discomfort    Fall Risk: Low   Comments: Ambulates with a steady gait with no assist device    Cultural needs: none      CAD Risk Factors:  Cholesterol: Yes  HTN: Yes  DM: No  Obesity: Yes   Inactivity: Yes      EXERCISE ASSESSMENT:     Initial Fitness Assessment: Submaximal TM ETT:  Resting:  BP: 120/70  HR: 60  SpO2: 98  Dyspnea: 0, Exercise:  BP: 150/90  HR: 96  SpO2: 98%, METs:  5.8, ECG Summary: NSR, Symptoms: none, and Test terminated at:  RHR +30      ECG INTERPRETATION:  NSR    Current Functional Status:  Occupation: full time job Physical Therapist  Recreation/Physical Activity: hunting and fishing  ADL’s:No limitations resumed driving  [place]: No limitations  Home exercise: Type: treadmill  Other Comments:       SMART Exercise Goals:   10% improvement in functional capacity based on max METs achieved in initial fitness assessment  maintain > 150 minutes per week of moderate intensity exercise    Patient Specific EXERCISE GOALS:       Walking about 3 miles   Improve your HDL  Reduce risk of heart disease    Functional Capacity Screening Tool:  [facility] Activity Status Index:  6.05 METs      PSYCHOSOCIAL ASSESSMENT:    Date of last Assessment:  09/4/24  Depression screening:  PHQ-9 = 4    Interpretation:  1-4 =  Minimal Depression  Anxiety screening:  NICOLE-7 = 6    Interpretation: 5-9 = Mild anxiety    Pt self-report of depression and anxiety   Patient has a history of depression  Reports sufficient emotional support     Self-reported stress level:  2   Stressors:  Health  Stress Management Tools: practice Relaxation Techniques, exercise, and keep a positive mindset    SMART Psychosocial Goals:     Feelings in [place] Score < 3, Physical Fitness in [facility] Score < 3, Daily Activity in [place] Score < 3, Pain in [place] Score < 3, Quality of Life in [facility] Score < 3 , and Change in Health in [facility] Score < 3     Patient Specific PSYCHOCOSOCIAL GOALS:    Enjoy life  Continue working   Be healthy    Quality of Life Screen:  (Higher score indicates disease impact on QOL)  [facility] COOP score: 29/45     Social Support:   spouse, children, and friends  Community/Social Activities: works full time / likes hunting and fishing     Psychosocial Assessment as it relates to rehabilitation:   Patient denies issues with his/her family or home life that may affect their rehabilitation efforts.       NUTRITION ASSESSMENT:    Initial Weight:  259  Current Weight: 259    Height:   Ht Readings from Last 1 Encounters:   08/14/24 6' (1.829 m)       Rate Your Plate Score: 51/81    Diabetes: N/A  A1c: 5.6    last measured: 8/2/24    Lipid management: Last lipid profile 08/15/24  Chol 264    HDL 23      Current Dietary Habits:  He had changed his dietrecently. He was drinking Dr Pepper soda and sweetened tea daily - He has stopped  drinks water. Trying to eat less sweets and processed meats.     SMART Nutrition Goals:   LDL <100, HDL >40, TRG <150, CHOL <200, decreased body fat % <33%  (F), Improved Rate Your Plate score  >58, 2.5-5%  wt loss, eat 5 or more servings of fruits and vegetables a day, and rarely eat processed meats or eat low fat processed meats    Patient Specific NUTRITION GOALS:     1. Lose weight    2.  Improve HDL and triglycerides   3. Eat more fruits and vegetables    Drug/Alcohol Use:   No      OTHER CORE COMPONENT ASSESSMENT:    Tobacco Use:     N/A:  Patient is a non-smoker     Anginal Symptoms:  chest pressure   NTG use: No prescription    SMART Goals:   consistent, controlled resting BP < 130/80 and medication compliance    Patient Specific CORE COMPONENT GOALS:    Maintain BP <130/80  Exercise regularly  Decrease heart disease      INDIVIDUALIZED TREATMENT PLAN      EXERCISE GOALS and PLAN      Progress toward Exercise goals:   Reviewed Pt goals and determined plan of care, Will continue to educate and progress as tolerated.    Exercise Intervention:    education on home exercise guidelines and home exercise 30+ mins 2 days opposite CR    The patient was counseled on exercise guidelines to achieve a minimum of 150 mins/wk of moderate intensity (RPE 4-6)   exercise and encouraged to add 1-2 days of exercise on opposite days of cardiac rehab as tolerated.     Current Aerobic Exercise Prescription:      Frequency: 3 days/week   Supplement with home exercise 2+ days/wk as tolerated       Minutes: 20 - 40         METS: 5.78            HR:  86-98   RPE: 4-6         Modalities: Treadmill     Exercise workloads will be progressed gradually as tolerated, within limits of patient's ability, and according to the patient's   response to the exercise program.      Aerobic Exercise Prescription Plan for Progression   Frequency: 3 days/week of cardiac rehab       Supplement with home exercise 2+ days/wk as tolerated    Minutes: 40       >150 mins/wk of moderate intensity exercise   METS: 4-6   HR: Intensity based on RPE 4-6      RPE: 4-6   Modalities: Treadmill, UBE, Lifecycle, Rower, NuStep, and Recumbent bike    Strength trainin-3 days / week  12-15 repetitions  1-2 sets per modality   Will be added following 2-3 weeks of monitored exercise sessions   Modalities: Chest Press, Pull Downs, Lateral Raise, and Arm  Extension    Home Exercise:  walking about an hour since discharged    Exercise Education: benefit of exercise for CAD risk factors, home exercise guidelines, AHA guidelines to achieve >150 mins/wk of moderate exercise, RPE scale, and physical activity/exercise in extreme weather conditions     Readiness to change: Contemplation:  (Acknowledging that there is a problem but not yet ready or sure of wanting to make a change)      NUTRITION GOALS AND PLAN      Nutritional   Reviewed patient's Rate your Plate. Discussed key elements of heart healthy eating. Reviewed patient goals for dietary modifications and their clinical implications.  Reviewed most recent lipid profile.     Patient's progress toward Nutrition goals:    Reviewed Pt goals and determined plan of care, Will continue to educate and progress as tolerated.      Nutrition Intervention:   group class: Reading Food Labels, group Class: Heart Healthy Eating, increase intake of whole grains, increase daily intake of fruits and vegetables, increase daily intake of low fat dairy, eat red meat once a week or less, and eat poultry without the skin    Measurable goals were based Rate Your Plate Dietary Self-Assessment. These are the areas in which the patient could score higher on the assessment.  Goals include recommendations for a heart healthy diet based on American Heart Association.    Nutrition Education:   weight loss and management strategies  low sodium diet  nutrition for  lipid management  healthy choices while dining out  portion control  group class: Heart Healthy Eating  group class:  Label Reading    Readiness to change: Pre-Contemplation:   (Not yet acknowledging that there is a problem behavior that needs to change)      PSYCHOSOCIAL GOALS AND PLAN    Psychosocial Assessment as it relates to rehabilitation:   Patient denies issues with his/her family or home life that may affect their rehabilitation efforts.     Patient's progress toward  Psychosocial goals:    Reviewed Pt goals and determined plan of care, Will continue to educate and progress as tolerated.    Psychosocial Intervention:   Class: Stress and Your Health, Practice relaxation techniques, Exercise, Enjoy a hobby such as hunting and fishing, Keep a positive mindset, and Enjoy family    Psychosocial Education: signs/sxs of depression, benefits of a positive support system, benefits of enrolling in [employer], depression and CAD, and class:  Stress and Your Health     Information to utilize [employer] was provided as well as contact information for counseling through  Behavioral [facility] and group psychotherapy groups available.    Readiness to change: Contemplation:  (Acknowledging that there is a problem but not yet ready or sure of wanting to make a change)      OTHER CORE COMPONENTS GOALS and PLAN      Blood Pressure will be monitored throughout the program and cardiologist will be notified of elevated trends.    Pt will be encouraged to monitor home BP if advised by cardiologist.    Tobacco Intervention:   N/A:  Pt is a non-smoker    Progress toward Core Component goals:   Reviewed Pt goals and determined plan of care, Will continue to educate and progress as tolerated.    Other Core Components Intervention:   group class: Understanding Heart Disease, group class: Common Heart Medications, medication compliance, avoid processed foods, check labels for sodium content, and monitor home BP    Group and Individual Education:  understanding high blood pressure and it's relationship to CAD and components of blood pressure management    Readiness to change: Contemplation:  (Acknowledging that there is a problem but not yet ready or sure of wanting to make a change)

## 2024-09-05 ENCOUNTER — CLINICAL SUPPORT (OUTPATIENT)
Dept: CARDIAC REHAB | Facility: HOSPITAL | Age: 49
End: 2024-09-05
Payer: COMMERCIAL

## 2024-09-05 DIAGNOSIS — I21.4 NSTEMI (NON-ST ELEVATED MYOCARDIAL INFARCTION) (HCC): Primary | ICD-10-CM

## 2024-09-05 PROCEDURE — 93798 PHYS/QHP OP CAR RHAB W/ECG: CPT

## 2024-09-09 ENCOUNTER — CLINICAL SUPPORT (OUTPATIENT)
Dept: CARDIAC REHAB | Facility: HOSPITAL | Age: 49
End: 2024-09-09
Payer: COMMERCIAL

## 2024-09-09 DIAGNOSIS — I21.4 NSTEMI (NON-ST ELEVATED MYOCARDIAL INFARCTION) (HCC): Primary | ICD-10-CM

## 2024-09-09 PROCEDURE — 93798 PHYS/QHP OP CAR RHAB W/ECG: CPT

## 2024-09-10 ENCOUNTER — CLINICAL SUPPORT (OUTPATIENT)
Dept: PULMONOLOGY | Facility: HOSPITAL | Age: 49
End: 2024-09-10
Payer: COMMERCIAL

## 2024-09-10 DIAGNOSIS — I21.4 NSTEMI (NON-ST ELEVATED MYOCARDIAL INFARCTION) (HCC): Primary | ICD-10-CM

## 2024-09-10 PROCEDURE — 93798 PHYS/QHP OP CAR RHAB W/ECG: CPT

## 2024-09-12 ENCOUNTER — CLINICAL SUPPORT (OUTPATIENT)
Dept: CARDIAC REHAB | Facility: HOSPITAL | Age: 49
End: 2024-09-12
Payer: COMMERCIAL

## 2024-09-12 DIAGNOSIS — I21.4 NSTEMI (NON-ST ELEVATED MYOCARDIAL INFARCTION) (HCC): Primary | ICD-10-CM

## 2024-09-12 PROCEDURE — 93798 PHYS/QHP OP CAR RHAB W/ECG: CPT

## 2024-09-16 ENCOUNTER — CLINICAL SUPPORT (OUTPATIENT)
Dept: CARDIAC REHAB | Facility: HOSPITAL | Age: 49
End: 2024-09-16
Attending: INTERNAL MEDICINE
Payer: COMMERCIAL

## 2024-09-16 DIAGNOSIS — Z95.5 STENTED CORONARY ARTERY: ICD-10-CM

## 2024-09-16 DIAGNOSIS — I21.4 NSTEMI (NON-ST ELEVATED MYOCARDIAL INFARCTION) (HCC): ICD-10-CM

## 2024-09-16 PROCEDURE — 93798 PHYS/QHP OP CAR RHAB W/ECG: CPT

## 2024-09-17 ENCOUNTER — CLINICAL SUPPORT (OUTPATIENT)
Dept: CARDIAC REHAB | Facility: HOSPITAL | Age: 49
End: 2024-09-17
Payer: COMMERCIAL

## 2024-09-17 DIAGNOSIS — I21.4 NSTEMI (NON-ST ELEVATED MYOCARDIAL INFARCTION) (HCC): Primary | ICD-10-CM

## 2024-09-17 PROCEDURE — 93798 PHYS/QHP OP CAR RHAB W/ECG: CPT

## 2024-09-17 NOTE — RESULT NOTES
Verified Results  (1) STOOL CULTURE [id number] 07:39AM Rhoda Crane     Test Name Result Flag Reference   CLINICAL REPORT (Report)     Test:        Stool culture  Specimen Source:  Per Rectum  Specimen Type:   Stool  Specimen Date:   3/9/2016 7:39 AM  Result Date:    3/12/2016 11:13 AM  Result Status:   Final result  Resulting Lab:   [address] [address]            Tel: [phone]                 CULTURE                                       ------------------                                   No Salmonella, Shigella or Campylobacter isolated  Shiga Toxin 1 NOT detected, Shiga Toxin 2 NOT detected     (1) OVA AND PARASITES EXAM [id number] 07:39AM Rhoda Crane   Performed at:  [address]  [id number]  : Corwin Boggs MD, Phone:  [phone]     Test Name Result Flag Reference   O&P CONC  EXAM      No ova, cysts, or parasites seen     One negative specimen does not rule out the possibility of a  parasitic infection  These results were obtained using wet preparation(s) and trichromestained smear  This test does not include testing for Cryptosporidiumparvum, Cyclospora, or Microsporidia 
no

## 2024-09-19 ENCOUNTER — CLINICAL SUPPORT (OUTPATIENT)
Dept: CARDIAC REHAB | Facility: HOSPITAL | Age: 49
End: 2024-09-19
Payer: COMMERCIAL

## 2024-09-19 DIAGNOSIS — I21.4 NSTEMI (NON-ST ELEVATED MYOCARDIAL INFARCTION) (HCC): Primary | ICD-10-CM

## 2024-09-19 PROCEDURE — 93798 PHYS/QHP OP CAR RHAB W/ECG: CPT

## 2024-09-23 ENCOUNTER — CLINICAL SUPPORT (OUTPATIENT)
Dept: CARDIAC REHAB | Facility: HOSPITAL | Age: 49
End: 2024-09-23
Payer: COMMERCIAL

## 2024-09-23 DIAGNOSIS — I21.4 NSTEMI (NON-ST ELEVATED MYOCARDIAL INFARCTION) (HCC): Primary | ICD-10-CM

## 2024-09-23 PROCEDURE — 93798 PHYS/QHP OP CAR RHAB W/ECG: CPT

## 2024-09-24 ENCOUNTER — CLINICAL SUPPORT (OUTPATIENT)
Dept: CARDIAC REHAB | Facility: HOSPITAL | Age: 49
End: 2024-09-24
Payer: COMMERCIAL

## 2024-09-24 DIAGNOSIS — I21.4 NSTEMI (NON-ST ELEVATED MYOCARDIAL INFARCTION) (HCC): Primary | ICD-10-CM

## 2024-09-24 PROCEDURE — 93798 PHYS/QHP OP CAR RHAB W/ECG: CPT

## 2024-09-26 ENCOUNTER — APPOINTMENT (OUTPATIENT)
Dept: CARDIAC REHAB | Facility: HOSPITAL | Age: 49
End: 2024-09-26
Payer: COMMERCIAL

## 2024-09-30 ENCOUNTER — APPOINTMENT (OUTPATIENT)
Dept: CARDIAC REHAB | Facility: HOSPITAL | Age: 49
End: 2024-09-30
Payer: COMMERCIAL

## 2024-10-01 ENCOUNTER — APPOINTMENT (OUTPATIENT)
Dept: CARDIAC REHAB | Facility: HOSPITAL | Age: 49
End: 2024-10-01
Payer: COMMERCIAL

## 2024-10-01 NOTE — PROGRESS NOTES
"CARDIAC REHABILITATION   ASSESSMENT AND INDIVIDUALIZED TREATMENT PLAN  30 DAY          Today's date: 10/1/2024   # of Exercise Sessions Completed: 10  Patient name: Jeronimo Fraser      : 1975  Age: 48 y.o.       MRN: [id number]  Referring Physician: Everett Malave/Yeimi Gomes MD  Cardiologist: Everett SilvermanKirstin  [facility]  Provider: Rosemary  Clinician: Rosina Blue MS      Comments: Jeronimo came for his cardiac rehab interview today. 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 He presented in [facility] after developing chest pain at work. He was transferred to [facility] and had a cardiac cath and stent insertion. His EF is 63%. He is starting on Repatha this week for his lipids. Jeronimo has completed 10 of 36 sessions. In his most recent session, his resting heart rate was 81 bpm with blood pressure 114/74. His peak heart rate was on the lifecycle bike at 118 bpm. Recovery heart rate was 102/68, and his recovery blood pressure was 102/68. During his most recent session, Jeronimo states that his RPE was in the moderate range. While on the treadmill, his METS were 4.54. Jeronimo received an in class education on \"Understanding Lung Disease\". He was NSR during his most recent Exercise Session. Jeronimo has prior knowledge of exercise, and has began to independently lift weights at home, while using his own treadmill and rower at home. Jeronimo states that he is active with his kids and practices with them to prepare them for their upcoming sporting events.         Dx: NSTEMI    Description of Diagnosis: NSTEMI  stent- Right posterolateral   Date of onset: 8/15/24  Other Cardiac History: HTN/Hyperlipidemia        ASSESSMENT    Medical History:   Past Medical History:   Diagnosis Date    Herpes     Hyperlipidemia     Prostatitis     Sleep apnea        Family History:  No family history on file.    Allergies:   Pistachio nut extract skin test - food allergy, Atorvastatin, and " Rosuvastatin    Current Medications:   Current Outpatient Medications   Medication Sig Dispense Refill    dicyclomine (BENTYL) 20 mg tablet Take 20 mg by mouth every 6 (six) hours. (Patient not taking: Reported on 4/1/2024)      fexofenadine (ALLEGRA) 180 MG tablet Take 180 mg by mouth daily. (Patient not taking: Reported on 8/14/2024)      lisinopril (ZESTRIL) 10 mg tablet Take 10 mg by mouth daily      omeprazole (PriLOSEC) 40 MG capsule Take 40 mg by mouth daily at bedtime      pilocarpine (SALAGEN) 5 mg tablet Take 10 mg by mouth 4 (four) times a day      rosuvastatin (CRESTOR) 20 MG tablet Take 20 mg by mouth. (Patient not taking: Reported on 4/1/2024)      testosterone (ANDROGEL) 1.62 % TD gel pump Apply 20.25 mg topically every morning      valACYclovir (VALTREX) 1,000 mg tablet Take 2,000 mg by mouth 2 (two) times a day as needed (take at onset of cold sore).       No current facility-administered medications for this visit.       Medication compliance: Yes   Comments: Pt reports to be compliant with medications    Physical Limitations: None /Torn muscle in right leg some discomfort    Fall Risk: Low   Comments: Ambulates with a steady gait with no assist device    Cultural needs: none      CAD Risk Factors:  Cholesterol: Yes  HTN: Yes  DM: No  Obesity: Yes   Inactivity: Yes      EXERCISE ASSESSMENT:     Initial Fitness Assessment: Submaximal TM ETT:  Resting:  BP: 120/70  HR: 60  SpO2: 98  Dyspnea: 0, Exercise:  BP: 150/90  HR: 96  SpO2: 98%, METs:  5.8, ECG Summary: NSR, Symptoms: none, and Test terminated at:  RHR +30      ECG INTERPRETATION:  NSR    Current Functional Status:  Occupation: full time job Physical Therapist  Recreation/Physical Activity: hunting and fishing  ADL’s:No limitations resumed driving  [place]: No limitations  Home exercise: Type: treadmill  Other Comments:       SMART Exercise Goals:   10% improvement in functional capacity based on max METs achieved in initial fitness  assessment  maintain > 150 minutes per week of moderate intensity exercise    Patient Specific EXERCISE GOALS:       Walking about 3 miles   Improve your HDL  Reduce risk of heart disease    Functional Capacity Screening Tool:  [facility] Activity Status Index:  6.05 METs      PSYCHOSOCIAL ASSESSMENT:    Date of last Assessment:  09/4/24  Depression screening:  PHQ-9 = 4    Interpretation:  1-4 = Minimal Depression  Anxiety screening:  NICOLE-7 = 6    Interpretation: 5-9 = Mild anxiety    Pt self-report of depression and anxiety   Patient has a history of depression  Reports sufficient emotional support     Self-reported stress level:  2   Stressors:  Health  Stress Management Tools: practice Relaxation Techniques, exercise, and keep a positive mindset    SMART Psychosocial Goals:     Feelings in [place] Score < 3, Physical Fitness in [place] Score < 3, Daily Activity in [place] Score < 3, Pain in [place] Score < 3, Quality of Life in [place] Score < 3 , and [facility] Score < 3     Patient Specific PSYCHOCOSOCIAL GOALS:    Enjoy life  Continue working   Be healthy    Quality of Life Screen:  (Higher score indicates disease impact on QOL)  [facility] COOP score: 29/45     Social Support:   spouse, children, and friends  Community/Social Activities: works full time / likes hunting and fishing     Psychosocial Assessment as it relates to rehabilitation:   Patient denies issues with his/her family or home life that may affect their rehabilitation efforts.       NUTRITION ASSESSMENT:    Initial Weight:  259  Current Weight: 260    Height:   Ht Readings from Last 1 Encounters:   08/14/24 6' (1.829 m)       Rate Your Plate Score: 51/81    Diabetes: N/A  A1c: 5.6    last measured: 8/2/24    Lipid management: Last lipid profile 08/15/24  Chol 264    HDL 23      Current Dietary Habits:  He had changed his dietrecently. He was drinking Dr Pepper soda and sweetened tea daily - He has stopped   drinks water. Trying to eat less sweets and processed meats.     SMART Nutrition Goals:   LDL <100, HDL >40, TRG <150, CHOL <200, decreased body fat % <33%  (F), Improved Rate Your Plate score  >58, 2.5-5%  wt loss, eat 5 or more servings of fruits and vegetables a day, and rarely eat processed meats or eat low fat processed meats    Patient Specific NUTRITION GOALS:     1. Lose weight    2. Improve HDL and triglycerides   3. Eat more fruits and vegetables    Drug/Alcohol Use:   No      OTHER CORE COMPONENT ASSESSMENT:    Tobacco Use:     N/A:  Patient is a non-smoker     Anginal Symptoms:  chest pressure   NTG use: No prescription    SMART Goals:   consistent, controlled resting BP < 130/80 and medication compliance    Patient Specific CORE COMPONENT GOALS:    Maintain BP <130/80  Exercise regularly  Decrease heart disease      INDIVIDUALIZED TREATMENT PLAN      EXERCISE GOALS and PLAN      Progress toward Exercise goals:   Reviewed Pt goals and determined plan of care, Will continue to educate and progress as tolerated.    Exercise Intervention:    education on home exercise guidelines and home exercise 30+ mins 2 days opposite CR    The patient was counseled on exercise guidelines to achieve a minimum of 150 mins/wk of moderate intensity (RPE 4-6)   exercise and encouraged to add 1-2 days of exercise on opposite days of cardiac rehab as tolerated.     Current Aerobic Exercise Prescription:      Frequency: 3 days/week   Supplement with home exercise 2+ days/wk as tolerated       Minutes: 45-50         METS: 4.5-5.0           HR:  86-98   RPE: 4-6         Modalities: Treadmill, Rower,     Exercise workloads will be progressed gradually as tolerated, within limits of patient's ability, and according to the patient's   response to the exercise program.      Aerobic Exercise Prescription Plan for Progression   Frequency: 3 days/week of cardiac rehab       Supplement with home exercise 2+ days/wk as tolerated     Minutes: 55-60      >150 mins/wk of moderate intensity exercise   METS: 4.5-6   HR: Intensity based on RPE 4-6      RPE: 4-6   Modalities: Treadmill, UBE, Lifecycle, Rower, NuStep, and Recumbent bike    Strength trainin-3 days / week  12-15 repetitions  1-2 sets per modality   Will be added following 2-3 weeks of monitored exercise sessions   Modalities: Chest Press, Pull Downs, Lateral Raise, and Arm Extension    Home Exercise:  walking about an hour since discharged, utilizing his rower, and started free weights. Started practicing with kids to prepare for upcoming sports.     Exercise Education: benefit of exercise for CAD risk factors, home exercise guidelines, AHA guidelines to achieve >150 mins/wk of moderate exercise, RPE scale, and physical activity/exercise in extreme weather conditions     Readiness to change: Contemplation:  (Acknowledging that there is a problem but not yet ready or sure of wanting to make a change)      NUTRITION GOALS AND PLAN      Nutritional   Reviewed patient's Rate your Plate. Discussed key elements of heart healthy eating. Reviewed patient goals for dietary modifications and their clinical implications.  Reviewed most recent lipid profile.     Patient's progress toward Nutrition goals:    Reviewed Pt goals and determined plan of care, Will continue to educate and progress as tolerated.      Nutrition Intervention:   group class: Reading Food Labels, group Class: Heart Healthy Eating, increase intake of whole grains, increase daily intake of fruits and vegetables, increase daily intake of low fat dairy, eat red meat once a week or less, and eat poultry without the skin    Measurable goals were based Rate Your Plate Dietary Self-Assessment. These are the areas in which the patient could score higher on the assessment.  Goals include recommendations for a heart healthy diet based on American Heart Association.    Nutrition Education:   weight loss and management  strategies  low sodium diet  nutrition for  lipid management  healthy choices while dining out  portion control  group class: Heart Healthy Eating  group class:  Label Reading    Readiness to change: Pre-Contemplation:   (Not yet acknowledging that there is a problem behavior that needs to change)      PSYCHOSOCIAL GOALS AND PLAN    Psychosocial Assessment as it relates to rehabilitation:   Patient denies issues with his/her family or home life that may affect their rehabilitation efforts.     Patient's progress toward Psychosocial goals:    Reviewed Pt goals and determined plan of care, Will continue to educate and progress as tolerated.    Psychosocial Intervention:   Class: Stress and Your Health, Practice relaxation techniques, Exercise, Enjoy a hobby such as hunting and fishing, Keep a positive mindset, and Enjoy family    Psychosocial Education: signs/sxs of depression, benefits of a positive support system, benefits of enrolling in [employer] Vices, depression and CAD, and class:  Stress and Your Health     Information to utilize [employer] [place] was provided as well as contact information for counseling through  Behavioral Health and group psychotherapy groups available.    Readiness to change: Contemplation:  (Acknowledging that there is a problem but not yet ready or sure of wanting to make a change)      OTHER CORE COMPONENTS GOALS and PLAN      Blood Pressure will be monitored throughout the program and cardiologist will be notified of elevated trends.    Pt will be encouraged to monitor home BP if advised by cardiologist.    Tobacco Intervention:   N/A:  Pt is a non-smoker    Progress toward Core Component goals:   Reviewed Pt goals and determined plan of care, Will continue to educate and progress as tolerated.    Other Core Components Intervention:   group class: Understanding Heart Disease, group class: Common Heart Medications, medication compliance, avoid processed foods, check labels for sodium content,  and monitor home BP    Group and Individual Education:  understanding high blood pressure and it's relationship to CAD and components of blood pressure management    Readiness to change: Contemplation:  (Acknowledging that there is a problem but not yet ready or sure of wanting to make a change)

## 2024-10-03 ENCOUNTER — CLINICAL SUPPORT (OUTPATIENT)
Dept: CARDIAC REHAB | Facility: HOSPITAL | Age: 49
End: 2024-10-03
Payer: COMMERCIAL

## 2024-10-03 DIAGNOSIS — I21.4 NSTEMI (NON-ST ELEVATED MYOCARDIAL INFARCTION) (HCC): Primary | ICD-10-CM

## 2024-10-03 PROCEDURE — 93798 PHYS/QHP OP CAR RHAB W/ECG: CPT

## 2024-10-08 ENCOUNTER — APPOINTMENT (OUTPATIENT)
Dept: CARDIAC REHAB | Facility: HOSPITAL | Age: 49
End: 2024-10-08
Payer: COMMERCIAL

## 2024-10-10 ENCOUNTER — APPOINTMENT (OUTPATIENT)
Dept: CARDIAC REHAB | Facility: HOSPITAL | Age: 49
End: 2024-10-10
Payer: COMMERCIAL

## 2024-10-10 ENCOUNTER — TELEPHONE (OUTPATIENT)
Dept: CARDIAC REHAB | Facility: HOSPITAL | Age: 49
End: 2024-10-10

## 2024-10-10 NOTE — TELEPHONE ENCOUNTER
Patient called CR department following cardiology appt. His cardiologist would like him to continue another 2-4 weeks. He plans to attend [facility] in on AM.

## 2024-10-14 ENCOUNTER — CLINICAL SUPPORT (OUTPATIENT)
Dept: CARDIAC REHAB | Facility: HOSPITAL | Age: 49
End: 2024-10-14
Payer: COMMERCIAL

## 2024-10-14 DIAGNOSIS — I21.4 NSTEMI (NON-ST ELEVATED MYOCARDIAL INFARCTION) (HCC): Primary | ICD-10-CM

## 2024-10-14 PROCEDURE — 93798 PHYS/QHP OP CAR RHAB W/ECG: CPT

## 2024-10-15 ENCOUNTER — APPOINTMENT (OUTPATIENT)
Dept: CARDIAC REHAB | Facility: HOSPITAL | Age: 49
End: 2024-10-15
Payer: COMMERCIAL

## 2024-10-17 ENCOUNTER — CLINICAL SUPPORT (OUTPATIENT)
Dept: CARDIAC REHAB | Facility: HOSPITAL | Age: 49
End: 2024-10-17
Payer: COMMERCIAL

## 2024-10-17 DIAGNOSIS — I21.4 NSTEMI (NON-ST ELEVATED MYOCARDIAL INFARCTION) (HCC): Primary | ICD-10-CM

## 2024-10-17 PROCEDURE — 93798 PHYS/QHP OP CAR RHAB W/ECG: CPT

## 2024-10-21 ENCOUNTER — CLINICAL SUPPORT (OUTPATIENT)
Dept: CARDIAC REHAB | Facility: HOSPITAL | Age: 49
End: 2024-10-21
Payer: COMMERCIAL

## 2024-10-21 DIAGNOSIS — I21.4 NSTEMI (NON-ST ELEVATED MYOCARDIAL INFARCTION) (HCC): Primary | ICD-10-CM

## 2024-10-21 PROCEDURE — 93798 PHYS/QHP OP CAR RHAB W/ECG: CPT

## 2024-10-22 ENCOUNTER — APPOINTMENT (OUTPATIENT)
Dept: CARDIAC REHAB | Facility: HOSPITAL | Age: 49
End: 2024-10-22
Payer: COMMERCIAL

## 2024-10-24 ENCOUNTER — CLINICAL SUPPORT (OUTPATIENT)
Dept: CARDIAC REHAB | Facility: HOSPITAL | Age: 49
End: 2024-10-24
Payer: COMMERCIAL

## 2024-10-24 DIAGNOSIS — I21.4 NSTEMI (NON-ST ELEVATED MYOCARDIAL INFARCTION) (HCC): Primary | ICD-10-CM

## 2024-10-24 PROCEDURE — 93798 PHYS/QHP OP CAR RHAB W/ECG: CPT

## 2024-10-28 ENCOUNTER — CLINICAL SUPPORT (OUTPATIENT)
Dept: CARDIAC REHAB | Facility: HOSPITAL | Age: 49
End: 2024-10-28
Payer: COMMERCIAL

## 2024-10-28 DIAGNOSIS — I21.4 NSTEMI (NON-ST ELEVATED MYOCARDIAL INFARCTION) (HCC): Primary | ICD-10-CM

## 2024-10-28 PROCEDURE — 93798 PHYS/QHP OP CAR RHAB W/ECG: CPT

## 2024-10-29 ENCOUNTER — APPOINTMENT (OUTPATIENT)
Dept: CARDIAC REHAB | Facility: HOSPITAL | Age: 49
End: 2024-10-29
Payer: COMMERCIAL

## 2024-10-29 NOTE — PROGRESS NOTES
"CARDIAC REHABILITATION   ASSESSMENT AND INDIVIDUALIZED TREATMENT PLAN  60 DAY          Today's date: 10/29/2024   # of Exercise Sessions Completed: 16  Patient name: Jeronimo Fraser      : 1975  Age: 48 y.o.       MRN: [id number]  Referring Physician: Everett Malave/Yeimi Gomes MD  Cardiologist: [facility]  Provider: Rosemary  Clinician: Rosina Blue MS      Comments: Jeronimo has completed 16 out of 36 sessions of cardiac rehab so far. He exercises for a total of 60-65 minutes of aerobic activity as well as participates in a resistance training portion of rehab. He attends rehab regularly and states that he enjoys coming and joking around with staff. He takes his health very seriously and asks staff how his EKG looks daily. He was recently having frequent PVC's and was told it was due to medication he was taking for being sick. He since stopped the medication and the PVC's have gone away even with an increase in intensity. Jeronimo stated that when he gets PVC's he can feel them and feels like \"his heart is trying to catch up\". He has not had the feeling since stopping the medication. He exercises at a maximum MET level of 4.54 METs and rates all modalities a 4/10 difficulty on the RPE scale. His resting BP's are consistently normotensive with a resting BP of 112/64 and a resting HR of 72 bpm. His BP increased to 144/76 with exercise with an exercise HR range of 110-120 bpm. His hemodynamic responses return to baseline with rest and recovery with a BP of 120/78 and a recovery HR of 100 bpm.    Exercise will be increased as tolerated.     Dx: NSTEMI    Description of Diagnosis: NSTEMI  stent- Right posterolateral   Date of onset: 8/15/24  Other Cardiac History: HTN/Hyperlipidemia        ASSESSMENT    Medical History:   Past Medical History:   Diagnosis Date    Herpes     Hyperlipidemia     Prostatitis     Sleep apnea        Family History:  No family history on file.    Allergies:   Pistachio " nut extract skin test - food allergy, Atorvastatin, and Rosuvastatin    Current Medications:   Current Outpatient Medications   Medication Sig Dispense Refill    dicyclomine (BENTYL) 20 mg tablet Take 20 mg by mouth every 6 (six) hours. (Patient not taking: Reported on 4/1/2024)      fexofenadine (ALLEGRA) 180 MG tablet Take 180 mg by mouth daily. (Patient not taking: Reported on 8/14/2024)      lisinopril (ZESTRIL) 10 mg tablet Take 10 mg by mouth daily      omeprazole (PriLOSEC) 40 MG capsule Take 40 mg by mouth daily at bedtime      pilocarpine (SALAGEN) 5 mg tablet Take 10 mg by mouth 4 (four) times a day      rosuvastatin (CRESTOR) 20 MG tablet Take 20 mg by mouth. (Patient not taking: Reported on 4/1/2024)      testosterone (ANDROGEL) 1.62 % TD gel pump Apply 20.25 mg topically every morning      valACYclovir (VALTREX) 1,000 mg tablet Take 2,000 mg by mouth 2 (two) times a day as needed (take at onset of cold sore).       No current facility-administered medications for this visit.       Medication compliance: Yes   Comments: Pt reports to be compliant with medications    Physical Limitations: Torn muscle in right leg some discomfort    Fall Risk: Low   Comments: Ambulates with a steady gait with no assist device    Cultural needs: none      CAD Risk Factors:  Cholesterol: Yes  HTN: Yes  DM: No  Obesity: Yes   Inactivity: Yes      EXERCISE ASSESSMENT:     Initial Fitness Assessment: Submaximal TM ETT:  Resting:  BP: 120/70  HR: 60  SpO2: 98  Dyspnea: 0, Exercise:  BP: 150/90  HR: 96  SpO2: 98%, METs:  5.8, ECG Summary: NSR, Symptoms: none, and Test terminated at:  RHR +30      ECG INTERPRETATION:  NSR    Current Functional Status:  Occupation: full time job Physical Therapist, 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       SMART Exercise Goals:   10% improvement in functional capacity based on max METs achieved in initial fitness assessment  maintain > 150 minutes per week of moderate intensity exercise    Patient Specific EXERCISE GOALS:       Walking about 3 miles   Improve your HDL  Reduce risk of heart disease    Functional Capacity Screening Tool:  [facility] Activity Status Index:  6.05 METs      PSYCHOSOCIAL ASSESSMENT:    Date of last Assessment:  09/4/24  Depression screening:  PHQ-9 = 4    Interpretation:  1-4 = Minimal Depression  Anxiety screening:  NICOLE-7 = 6    Interpretation: 5-9 = Mild anxiety    Pt self-report of depression and anxiety   Patient has a history of depression  Reports sufficient emotional support     Self-reported stress level:  2   Stressors:  Health  Stress Management Tools: practice Relaxation Techniques, exercise, and keep a positive mindset    SMART Psychosocial Goals:     Feelings in [place] Score < 3, Physical Fitness in [place] Score < 3, Daily Activity in [place] Score < 3, Pain in [place] Score < 3, Quality of Life in [place] Score < 3 , and Change in Health in [place] Score < 3     Patient Specific PSYCHOCOSOCIAL GOALS:    Enjoy life  Continue working   Be healthy    Quality of Life Screen:  (Higher score indicates disease impact on QOL)  [facility]: 29/45     Social Support:   spouse, children, and friends  Community/Social Activities: works full time / likes hunting and fishing     Psychosocial Assessment as it relates to rehabilitation:   Patient denies issues with his/her family or home life that may affect their rehabilitation efforts.       NUTRITION ASSESSMENT:    Initial Weight:  259  Current Weight: 260    Height:   Ht Readings from Last 1 Encounters:   08/14/24 6' (1.829 m)       Rate Your Plate Score: 51/81    Diabetes: N/A  A1c: 5.6    last measured: 8/2/24    Lipid management: Last lipid  profile 10/09/24  Chol 130    HDL 55  LDL 95      Current Dietary Habits:  He had changed his dietrecently. He was drinking Dr Pepper soda and sweetened tea daily - He has stopped  drinks water. Trying to eat less sweets and processed meats.     SMART Nutrition Goals:   LDL <100, HDL >40, TRG <150, CHOL <200, decreased body fat % <33%  (F), Improved Rate Your Plate score  >58, 2.5-5%  wt loss, eat 5 or more servings of fruits and vegetables a day, and rarely eat processed meats or eat low fat processed meats    Patient Specific NUTRITION GOALS:     1. Lose weight    2. Improve HDL and triglycerides   3. Eat more fruits and vegetables    Drug/Alcohol Use:   No      OTHER CORE COMPONENT ASSESSMENT:    Tobacco Use:     N/A:  Patient is a non-smoker     Anginal Symptoms:  chest pressure   NTG use: No prescription    SMART Goals:   consistent, controlled resting BP < 130/80 and medication compliance    Patient Specific CORE COMPONENT GOALS:    Maintain BP <130/80  Exercise regularly  Decrease heart disease      INDIVIDUALIZED TREATMENT PLAN      EXERCISE GOALS and PLAN      Progress toward Exercise goals:   Reviewed Pt goals and determined plan of care, Will continue to educate and progress as tolerated.    Exercise Intervention:    education on home exercise guidelines and home exercise 30+ mins 2 days opposite CR    The patient was counseled on exercise guidelines to achieve a minimum of 150 mins/wk of moderate intensity (RPE 4-6)   exercise and encouraged to add 1-2 days of exercise on opposite days of cardiac rehab as tolerated.     Current Aerobic Exercise Prescription:      Frequency: 3 days/week   Supplement with home exercise 2+ days/wk as tolerated       Minutes: 55-60         METS: 4.5-5.0           HR:  86-98   RPE: 4-6         Modalities: Treadmill, Lifecycle, Elliptical, and Rower    Exercise workloads will be progressed gradually as tolerated, within limits of patient's ability, and according to the  patient's   response to the exercise program.      Aerobic Exercise Prescription Plan for Progression   Frequency: 3 days/week of cardiac rehab       Supplement with home exercise 2+ days/wk as tolerated    Minutes: 55-60      >150 mins/wk of moderate intensity exercise   METS: 5.0-5.5   HR: Intensity based on RPE 4-6      RPE: 4-6   Modalities: Treadmill, Lifecycle, Elliptical, Rower, and NuStep    Strength trainin-3 days / week  12-15 repetitions  1-2 sets per modality   Will be added following 2-3 weeks of monitored exercise sessions   Modalities: Chest Press, Pull Downs, Lateral Raise, and Arm Extension    Home Exercise:  walking about an hour since discharged, utilizing his rower, and started free weights. Started practicing with kids to prepare for upcoming sports.     Exercise Education: benefit of exercise for CAD risk factors, home exercise guidelines, AHA guidelines to achieve >150 mins/wk of moderate exercise, RPE scale, and physical activity/exercise in extreme weather conditions     Readiness to change: Contemplation:  (Acknowledging that there is a problem but not yet ready or sure of wanting to make a change)      NUTRITION GOALS AND PLAN      Nutritional   Reviewed patient's Rate your Plate. Discussed key elements of heart healthy eating. Reviewed patient goals for dietary modifications and their clinical implications.  Reviewed most recent lipid profile.     Patient's progress toward Nutrition goals:    Reviewed Pt goals and determined plan of care, Will continue to educate and progress as tolerated.      Nutrition Intervention:   group class: Reading Food Labels, group Class: Heart Healthy Eating, increase intake of whole grains, increase daily intake of fruits and vegetables, increase daily intake of low fat dairy, eat red meat once a week or less, and eat poultry without the skin    Measurable goals were based Rate Your Plate Dietary Self-Assessment. These are the areas in which the patient  could score higher on the assessment.  Goals include recommendations for a heart healthy diet based on American Heart Association.    Nutrition Education:   weight loss and management strategies  low sodium diet  nutrition for  lipid management  healthy choices while dining out  portion control  group class: Heart Healthy Eating  group class:  Label Reading    Readiness to change: Pre-Contemplation:   (Not yet acknowledging that there is a problem behavior that needs to change)      PSYCHOSOCIAL GOALS AND PLAN    Psychosocial Assessment as it relates to rehabilitation:   Patient denies issues with his/her family or home life that may affect their rehabilitation efforts.     Patient's progress toward Psychosocial goals:    Reviewed Pt goals and determined plan of care, Will continue to educate and progress as tolerated.    Psychosocial Intervention:   Class: Stress and Your Health, Practice relaxation techniques, Exercise, Enjoy a hobby such as hunting and fishing, Keep a positive mindset, and Enjoy family    Psychosocial Education: signs/sxs of depression, benefits of a positive support system, benefits of enrolling in [employer], depression and CAD, and class:  Stress and Your Health     Information to utilize [employer] was provided as well as contact information for counseling through  Behavioral Health and group psychotherapy groups available.    Readiness to change: Contemplation:  (Acknowledging that there is a problem but not yet ready or sure of wanting to make a change)      OTHER CORE COMPONENTS GOALS and PLAN      Blood Pressure will be monitored throughout the program and cardiologist will be notified of elevated trends.    Pt will be encouraged to monitor home BP if advised by cardiologist.    Tobacco Intervention:   N/A:  Pt is a non-smoker    Progress toward Core Component goals:   Reviewed Pt goals and determined plan of care, Will continue to educate and progress as tolerated.    Other Core  Components Intervention:   group class: Understanding Heart Disease, group class: Common Heart Medications, medication compliance, avoid processed foods, check labels for sodium content, and monitor home BP    Group and Individual Education:  understanding high blood pressure and it's relationship to CAD and components of blood pressure management    Readiness to change: Contemplation:  (Acknowledging that there is a problem but not yet ready or sure of wanting to make a change)

## 2024-10-31 ENCOUNTER — APPOINTMENT (OUTPATIENT)
Dept: CARDIAC REHAB | Facility: HOSPITAL | Age: 49
End: 2024-10-31
Payer: COMMERCIAL

## 2024-11-07 ENCOUNTER — TELEPHONE (OUTPATIENT)
Dept: CARDIAC REHAB | Facility: HOSPITAL | Age: 49
End: 2024-11-07